# Patient Record
Sex: MALE | Race: WHITE | Employment: OTHER | URBAN - METROPOLITAN AREA
[De-identification: names, ages, dates, MRNs, and addresses within clinical notes are randomized per-mention and may not be internally consistent; named-entity substitution may affect disease eponyms.]

---

## 2019-05-09 ENCOUNTER — APPOINTMENT (OUTPATIENT)
Dept: GENERAL RADIOLOGY | Age: 84
DRG: 194 | End: 2019-05-09
Payer: MEDICARE

## 2019-05-09 ENCOUNTER — HOSPITAL ENCOUNTER (INPATIENT)
Age: 84
LOS: 4 days | Discharge: HOME | DRG: 194 | End: 2019-05-13
Attending: EMERGENCY MEDICINE | Admitting: INTERNAL MEDICINE
Payer: MEDICARE

## 2019-05-09 DIAGNOSIS — J18.9 PNEUMONIA DUE TO ORGANISM: Primary | ICD-10-CM

## 2019-05-09 DIAGNOSIS — G89.29 OTHER CHRONIC PAIN: ICD-10-CM

## 2019-05-09 LAB
A/G RATIO: 1.2 (ref 1.1–2.2)
ALBUMIN SERPL-MCNC: 3.1 G/DL (ref 3.4–5)
ALP BLD-CCNC: 33 U/L (ref 40–129)
ALT SERPL-CCNC: 12 U/L (ref 10–40)
ANION GAP SERPL CALCULATED.3IONS-SCNC: 11 MMOL/L (ref 3–16)
AST SERPL-CCNC: 17 U/L (ref 15–37)
BASOPHILS ABSOLUTE: 0 K/UL (ref 0–0.2)
BASOPHILS RELATIVE PERCENT: 0.5 %
BILIRUB SERPL-MCNC: 0.3 MG/DL (ref 0–1)
BUN BLDV-MCNC: 27 MG/DL (ref 7–20)
CALCIUM SERPL-MCNC: 8 MG/DL (ref 8.3–10.6)
CHLORIDE BLD-SCNC: 106 MMOL/L (ref 99–110)
CO2: 21 MMOL/L (ref 21–32)
CREAT SERPL-MCNC: 1.4 MG/DL (ref 0.8–1.3)
EOSINOPHILS ABSOLUTE: 0.8 K/UL (ref 0–0.6)
EOSINOPHILS RELATIVE PERCENT: 10.1 %
GFR AFRICAN AMERICAN: 58
GFR NON-AFRICAN AMERICAN: 48
GLOBULIN: 2.5 G/DL
GLUCOSE BLD-MCNC: 117 MG/DL (ref 70–99)
HCT VFR BLD CALC: 25.6 % (ref 40.5–52.5)
HEMOGLOBIN: 8.6 G/DL (ref 13.5–17.5)
INR BLD: 1.19 (ref 0.86–1.14)
LYMPHOCYTES ABSOLUTE: 1 K/UL (ref 1–5.1)
LYMPHOCYTES RELATIVE PERCENT: 13.3 %
MCH RBC QN AUTO: 32.1 PG (ref 26–34)
MCHC RBC AUTO-ENTMCNC: 33.4 G/DL (ref 31–36)
MCV RBC AUTO: 96.2 FL (ref 80–100)
MONOCYTES ABSOLUTE: 0.7 K/UL (ref 0–1.3)
MONOCYTES RELATIVE PERCENT: 9 %
NEUTROPHILS ABSOLUTE: 5.2 K/UL (ref 1.7–7.7)
NEUTROPHILS RELATIVE PERCENT: 67.1 %
PDW BLD-RTO: 13.7 % (ref 12.4–15.4)
PLATELET # BLD: 187 K/UL (ref 135–450)
PMV BLD AUTO: 5.9 FL (ref 5–10.5)
POTASSIUM REFLEX MAGNESIUM: 3.6 MMOL/L (ref 3.5–5.1)
PRO-BNP: 1695 PG/ML (ref 0–449)
PROTHROMBIN TIME: 13.6 SEC (ref 9.8–13)
RBC # BLD: 2.66 M/UL (ref 4.2–5.9)
SODIUM BLD-SCNC: 138 MMOL/L (ref 136–145)
TOTAL PROTEIN: 5.6 G/DL (ref 6.4–8.2)
TROPONIN: 0.03 NG/ML
WBC # BLD: 7.7 K/UL (ref 4–11)

## 2019-05-09 PROCEDURE — 99285 EMERGENCY DEPT VISIT HI MDM: CPT

## 2019-05-09 PROCEDURE — 84484 ASSAY OF TROPONIN QUANT: CPT

## 2019-05-09 PROCEDURE — 85025 COMPLETE CBC W/AUTO DIFF WBC: CPT

## 2019-05-09 PROCEDURE — 71045 X-RAY EXAM CHEST 1 VIEW: CPT

## 2019-05-09 PROCEDURE — 6360000002 HC RX W HCPCS: Performed by: EMERGENCY MEDICINE

## 2019-05-09 PROCEDURE — 2060000000 HC ICU INTERMEDIATE R&B

## 2019-05-09 PROCEDURE — 85610 PROTHROMBIN TIME: CPT

## 2019-05-09 PROCEDURE — 80053 COMPREHEN METABOLIC PANEL: CPT

## 2019-05-09 PROCEDURE — 96374 THER/PROPH/DIAG INJ IV PUSH: CPT

## 2019-05-09 PROCEDURE — 83880 ASSAY OF NATRIURETIC PEPTIDE: CPT

## 2019-05-09 PROCEDURE — 93005 ELECTROCARDIOGRAM TRACING: CPT | Performed by: EMERGENCY MEDICINE

## 2019-05-09 RX ORDER — MONTELUKAST SODIUM 10 MG/1
10 TABLET ORAL NIGHTLY
COMMUNITY

## 2019-05-09 RX ORDER — ONDANSETRON 4 MG/1
4 TABLET, FILM COATED ORAL EVERY 8 HOURS PRN
COMMUNITY

## 2019-05-09 RX ORDER — POLYETHYLENE GLYCOL 3350 17 G/17G
17 POWDER, FOR SOLUTION ORAL DAILY
COMMUNITY

## 2019-05-09 RX ORDER — MEMANTINE HYDROCHLORIDE 10 MG/1
10 TABLET ORAL 2 TIMES DAILY
COMMUNITY

## 2019-05-09 RX ORDER — PANTOPRAZOLE SODIUM 40 MG/1
40 GRANULE, DELAYED RELEASE ORAL
Status: ON HOLD | COMMUNITY
End: 2019-05-13 | Stop reason: HOSPADM

## 2019-05-09 RX ORDER — TOLTERODINE TARTRATE 2 MG/1
2 TABLET, EXTENDED RELEASE ORAL 2 TIMES DAILY
COMMUNITY

## 2019-05-09 RX ORDER — BUDESONIDE AND FORMOTEROL FUMARATE DIHYDRATE 80; 4.5 UG/1; UG/1
2 AEROSOL RESPIRATORY (INHALATION) 2 TIMES DAILY
COMMUNITY

## 2019-05-09 RX ORDER — DONEPEZIL HYDROCHLORIDE 5 MG/1
5 TABLET, FILM COATED ORAL NIGHTLY
COMMUNITY

## 2019-05-09 RX ORDER — ESCITALOPRAM OXALATE 10 MG/1
20 TABLET ORAL DAILY
COMMUNITY

## 2019-05-09 RX ORDER — AZITHROMYCIN 500 MG/1
500 INJECTION, POWDER, LYOPHILIZED, FOR SOLUTION INTRAVENOUS ONCE
Status: DISCONTINUED | OUTPATIENT
Start: 2019-05-09 | End: 2019-05-09 | Stop reason: CLARIF

## 2019-05-09 RX ORDER — ARFORMOTEROL TARTRATE 15 UG/2ML
1 SOLUTION RESPIRATORY (INHALATION) 2 TIMES DAILY
COMMUNITY

## 2019-05-09 RX ORDER — ATORVASTATIN CALCIUM 10 MG/1
10 TABLET, FILM COATED ORAL DAILY
COMMUNITY

## 2019-05-09 RX ORDER — BUDESONIDE 0.5 MG/2ML
1 INHALANT ORAL 2 TIMES DAILY
Status: ON HOLD | COMMUNITY
End: 2019-05-13 | Stop reason: HOSPADM

## 2019-05-09 RX ADMIN — Medication 1 G: at 23:06

## 2019-05-09 NOTE — LETTER
Beneficiary Notification Letter     This [facility] Provider is Participating in an Innovative Payment and [address] [place] from [address]:   [facility] is participating in a Medicare initiative called the [facility]. You are receiving this letter because your health care provider has identified you as a patient who is receiving care through this initiative. Health care providers participating in the [facility], including [facility], will work with Medicare to improve care for patients. Your Medicare rights have not been changed. You still have all the same Medicare rights and protections, including the right to choose which hospital, doctor, or other health care provider you see. However, because [facility] chose to participate in the [address] [place] [address], all Medicare beneficiaries who meet the eligibility criteria of this initiative will receive care under the initiative. If you do not wish to receive care under the Bundled Payments [facility], you must choose a health care provider that does not participate in this initiative for your care. Regardless of which health care provider you see, Medicare will continue to cover all of your medically necessary services. Bundled Payments for Care Improvement Advanced aims to help improve your care     The Bundled Payments [facility] is an innovative Medicare initiative that encourages your doctors, hospitals, and other health care providers to work more closely together so you get better care during and following certain hospital stays.  In this initiative, doctors and hospitals may work closely with certain health care providers and suppliers that help patients recover after discharge from the hospital, including skilled nursing facilities, home health agencies, inpatient rehabilitation facilities, and long term care hospitals. [facility] is working closely with the doctors and other health care providers that care for you during and following your hospital stay and for a period of time after you leave the hospital. By working together, the health care providers are trying to more efficiently provide well-managed, high quality, patient-centered care as you undergo treatment. Hospitals, doctors, and other health care providers that care for you following a hospital stay may receive an additional payment for providing better, more coordinated health care. Medicare will monitor your care to make sure you and others get high quality care. Your feedback is important     Medicare may also ask you to answer a survey about the services and care you received from [address] [place] [address] will be mailed to you. Your feedback will improve care for all people with Medicare who receive care from [facility]. Completion of this survey is optional.     Get more information     For more information about the Bundled Payments for [facility], you can:    · Visit the CMS BPCI Advanced Website at http://myers-mccoy.net/ initiatives/bpci-advanced   · Call the [facility] team at (651) [phone]. · Call 1-800-MEDICARE (6-750.588.2164). TTY users can call 5-202.575.5453     If you have concerns or complaints about your care, talk to your health care provider, or contact your Beneficiary and Family Centered Quality Improvement Organization [facility]). To get your [facility]'s phone number, visit Medicare.gov/contacts or call 1-800-MEDICARE. · To find a different hospital, visit www. hospitalcompare.[facility].gov or call 1-800[employer] MEDICARE (1-263.798.7886). TTY users should call 9-586.330.9780. · To find a different doctor, visit Medicare's Physician Compare website, HDTapes.co.nz, or call 1-800-MEDICARE (065 6058). TTY users should call 9-343.669.2085. · To find a different skilled nursing facility, visit [facility] Medico website, https://www.Kitchon.Qbox.io/, or call 1-800-MEDICARE (1- 581.566.6681). TTY users should call 9-455.757.7270. · To find a different long term care hospital, visit [facility] Compare website, Smellology.be, or call 1-800- MEDICARE (092 4980). TTY users should call 2-755.992.6025. · To find a different inpatient rehabilitation facility, visit [address] [facility] [address] Compare website, www.medicare.gov/ inpatientrehabilitation facilitycompare, or call 1-800-MEDICARE (4-185.574.8746). TTY users should call 8- 561.488.6110. · To find a different home health agency, visit [address] website, www.medicare.gov/homehealthcompare, or call 1-800-MEDICARE (2-382- 359-5016). TTY users should call 7-893.975.1158.

## 2019-05-10 PROBLEM — I50.32 CHRONIC DIASTOLIC HEART FAILURE (HCC): Status: ACTIVE | Noted: 2019-05-10

## 2019-05-10 LAB
A/G RATIO: 1.2 (ref 1.1–2.2)
ALBUMIN SERPL-MCNC: 3.5 G/DL (ref 3.4–5)
ALP BLD-CCNC: 36 U/L (ref 40–129)
ALT SERPL-CCNC: 14 U/L (ref 10–40)
ANION GAP SERPL CALCULATED.3IONS-SCNC: 11 MMOL/L (ref 3–16)
AST SERPL-CCNC: 20 U/L (ref 15–37)
BASOPHILS ABSOLUTE: 0.1 K/UL (ref 0–0.2)
BASOPHILS RELATIVE PERCENT: 0.7 %
BILIRUB SERPL-MCNC: 0.3 MG/DL (ref 0–1)
BUN BLDV-MCNC: 29 MG/DL (ref 7–20)
CALCIUM SERPL-MCNC: 9.8 MG/DL (ref 8.3–10.6)
CHLORIDE BLD-SCNC: 100 MMOL/L (ref 99–110)
CO2: 28 MMOL/L (ref 21–32)
CREAT SERPL-MCNC: 1.8 MG/DL (ref 0.8–1.3)
EKG ATRIAL RATE: 70 BPM
EKG DIAGNOSIS: NORMAL
EKG Q-T INTERVAL: 444 MS
EKG QRS DURATION: 116 MS
EKG QTC CALCULATION (BAZETT): 482 MS
EKG R AXIS: 19 DEGREES
EKG T AXIS: 30 DEGREES
EKG VENTRICULAR RATE: 71 BPM
EOSINOPHILS ABSOLUTE: 0.8 K/UL (ref 0–0.6)
EOSINOPHILS RELATIVE PERCENT: 10.2 %
GFR AFRICAN AMERICAN: 43
GFR NON-AFRICAN AMERICAN: 36
GLOBULIN: 2.9 G/DL
GLUCOSE BLD-MCNC: 94 MG/DL (ref 70–99)
HCT VFR BLD CALC: 30.5 % (ref 40.5–52.5)
HEMOGLOBIN: 10.3 G/DL (ref 13.5–17.5)
LEFT VENTRICULAR EJECTION FRACTION HIGH VALUE: 60 %
LEFT VENTRICULAR EJECTION FRACTION MODE: NORMAL
LV EF: 60 %
LVEF MODALITY: NORMAL
LYMPHOCYTES ABSOLUTE: 1.2 K/UL (ref 1–5.1)
LYMPHOCYTES RELATIVE PERCENT: 16.1 %
MCH RBC QN AUTO: 31.7 PG (ref 26–34)
MCHC RBC AUTO-ENTMCNC: 33.7 G/DL (ref 31–36)
MCV RBC AUTO: 94.1 FL (ref 80–100)
MONOCYTES ABSOLUTE: 0.7 K/UL (ref 0–1.3)
MONOCYTES RELATIVE PERCENT: 8.8 %
NEUTROPHILS ABSOLUTE: 4.9 K/UL (ref 1.7–7.7)
NEUTROPHILS RELATIVE PERCENT: 64.2 %
PDW BLD-RTO: 13.8 % (ref 12.4–15.4)
PLATELET # BLD: 201 K/UL (ref 135–450)
PMV BLD AUTO: 6 FL (ref 5–10.5)
POTASSIUM REFLEX MAGNESIUM: 4 MMOL/L (ref 3.5–5.1)
RBC # BLD: 3.24 M/UL (ref 4.2–5.9)
SODIUM BLD-SCNC: 139 MMOL/L (ref 136–145)
TOTAL PROTEIN: 6.4 G/DL (ref 6.4–8.2)
TROPONIN: 0.03 NG/ML
WBC # BLD: 7.7 K/UL (ref 4–11)

## 2019-05-10 PROCEDURE — 6360000002 HC RX W HCPCS: Performed by: INTERNAL MEDICINE

## 2019-05-10 PROCEDURE — 94760 N-INVAS EAR/PLS OXIMETRY 1: CPT

## 2019-05-10 PROCEDURE — 6370000000 HC RX 637 (ALT 250 FOR IP): Performed by: INTERNAL MEDICINE

## 2019-05-10 PROCEDURE — 6360000004 HC RX CONTRAST MEDICATION: Performed by: INTERNAL MEDICINE

## 2019-05-10 PROCEDURE — 36415 COLL VENOUS BLD VENIPUNCTURE: CPT

## 2019-05-10 PROCEDURE — 97535 SELF CARE MNGMENT TRAINING: CPT

## 2019-05-10 PROCEDURE — 2580000003 HC RX 258: Performed by: EMERGENCY MEDICINE

## 2019-05-10 PROCEDURE — 2580000003 HC RX 258: Performed by: INTERNAL MEDICINE

## 2019-05-10 PROCEDURE — 6360000002 HC RX W HCPCS: Performed by: EMERGENCY MEDICINE

## 2019-05-10 PROCEDURE — C8929 TTE W OR WO FOL WCON,DOPPLER: HCPCS

## 2019-05-10 PROCEDURE — 84484 ASSAY OF TROPONIN QUANT: CPT

## 2019-05-10 PROCEDURE — 2060000000 HC ICU INTERMEDIATE R&B

## 2019-05-10 PROCEDURE — 97166 OT EVAL MOD COMPLEX 45 MIN: CPT

## 2019-05-10 PROCEDURE — 80053 COMPREHEN METABOLIC PANEL: CPT

## 2019-05-10 PROCEDURE — 2580000003 HC RX 258

## 2019-05-10 PROCEDURE — 93010 ELECTROCARDIOGRAM REPORT: CPT | Performed by: INTERNAL MEDICINE

## 2019-05-10 PROCEDURE — 85025 COMPLETE CBC W/AUTO DIFF WBC: CPT

## 2019-05-10 PROCEDURE — 94640 AIRWAY INHALATION TREATMENT: CPT

## 2019-05-10 RX ORDER — GABAPENTIN 100 MG/1
100 CAPSULE ORAL NIGHTLY
Status: DISCONTINUED | OUTPATIENT
Start: 2019-05-10 | End: 2019-05-13 | Stop reason: HOSPADM

## 2019-05-10 RX ORDER — ISOSORBIDE MONONITRATE 60 MG/1
60 TABLET, EXTENDED RELEASE ORAL DAILY
Status: DISCONTINUED | OUTPATIENT
Start: 2019-05-10 | End: 2019-05-13 | Stop reason: HOSPADM

## 2019-05-10 RX ORDER — FORMOTEROL FUMARATE 20 UG/2ML
20 SOLUTION RESPIRATORY (INHALATION) 2 TIMES DAILY
Status: DISCONTINUED | OUTPATIENT
Start: 2019-05-10 | End: 2019-05-13 | Stop reason: HOSPADM

## 2019-05-10 RX ORDER — DOCUSATE SODIUM 100 MG/1
100 CAPSULE, LIQUID FILLED ORAL 2 TIMES DAILY
Status: DISCONTINUED | OUTPATIENT
Start: 2019-05-10 | End: 2019-05-13 | Stop reason: HOSPADM

## 2019-05-10 RX ORDER — BUDESONIDE 0.5 MG/2ML
500 INHALANT ORAL 2 TIMES DAILY
Status: DISCONTINUED | OUTPATIENT
Start: 2019-05-10 | End: 2019-05-13 | Stop reason: HOSPADM

## 2019-05-10 RX ORDER — ESCITALOPRAM OXALATE 10 MG/1
10 TABLET ORAL DAILY
Status: DISCONTINUED | OUTPATIENT
Start: 2019-05-10 | End: 2019-05-13 | Stop reason: HOSPADM

## 2019-05-10 RX ORDER — M-VIT,TX,IRON,MINS/CALC/FOLIC 27MG-0.4MG
1 TABLET ORAL DAILY
Status: DISCONTINUED | OUTPATIENT
Start: 2019-05-10 | End: 2019-05-13 | Stop reason: HOSPADM

## 2019-05-10 RX ORDER — SODIUM CHLORIDE 9 MG/ML
INJECTION, SOLUTION INTRAVENOUS
Status: COMPLETED
Start: 2019-05-10 | End: 2019-05-10

## 2019-05-10 RX ORDER — LATANOPROST 50 UG/ML
1 SOLUTION/ DROPS OPHTHALMIC NIGHTLY
Status: DISCONTINUED | OUTPATIENT
Start: 2019-05-10 | End: 2019-05-13 | Stop reason: HOSPADM

## 2019-05-10 RX ORDER — NITROGLYCERIN 0.4 MG/1
0.4 TABLET SUBLINGUAL EVERY 5 MIN PRN
Status: DISCONTINUED | OUTPATIENT
Start: 2019-05-10 | End: 2019-05-13 | Stop reason: HOSPADM

## 2019-05-10 RX ORDER — OYSTER SHELL CALCIUM WITH VITAMIN D 500; 200 MG/1; [IU]/1
1 TABLET, FILM COATED ORAL DAILY
Status: DISCONTINUED | OUTPATIENT
Start: 2019-05-10 | End: 2019-05-13 | Stop reason: HOSPADM

## 2019-05-10 RX ORDER — LOPERAMIDE HYDROCHLORIDE 2 MG/1
2 CAPSULE ORAL EVERY 6 HOURS PRN
Status: DISCONTINUED | OUTPATIENT
Start: 2019-05-10 | End: 2019-05-13 | Stop reason: HOSPADM

## 2019-05-10 RX ORDER — FUROSEMIDE 20 MG/1
20 TABLET ORAL DAILY
Status: DISCONTINUED | OUTPATIENT
Start: 2019-05-10 | End: 2019-05-13 | Stop reason: HOSPADM

## 2019-05-10 RX ORDER — TROSPIUM CHLORIDE 20 MG/1
20 TABLET, FILM COATED ORAL
Status: DISCONTINUED | OUTPATIENT
Start: 2019-05-10 | End: 2019-05-13 | Stop reason: HOSPADM

## 2019-05-10 RX ORDER — PROMETHAZINE HYDROCHLORIDE 25 MG/1
25 TABLET ORAL EVERY 6 HOURS PRN
Status: DISCONTINUED | OUTPATIENT
Start: 2019-05-10 | End: 2019-05-13 | Stop reason: HOSPADM

## 2019-05-10 RX ORDER — MEMANTINE HYDROCHLORIDE 5 MG/1
10 TABLET ORAL 2 TIMES DAILY
Status: DISCONTINUED | OUTPATIENT
Start: 2019-05-10 | End: 2019-05-13 | Stop reason: HOSPADM

## 2019-05-10 RX ORDER — ONDANSETRON 4 MG/1
4 TABLET, ORALLY DISINTEGRATING ORAL EVERY 8 HOURS PRN
Status: DISCONTINUED | OUTPATIENT
Start: 2019-05-10 | End: 2019-05-13 | Stop reason: HOSPADM

## 2019-05-10 RX ORDER — ASPIRIN 81 MG/1
81 TABLET ORAL DAILY
Status: DISCONTINUED | OUTPATIENT
Start: 2019-05-10 | End: 2019-05-13 | Stop reason: HOSPADM

## 2019-05-10 RX ORDER — ATORVASTATIN CALCIUM 10 MG/1
10 TABLET, FILM COATED ORAL DAILY
Status: DISCONTINUED | OUTPATIENT
Start: 2019-05-10 | End: 2019-05-13 | Stop reason: HOSPADM

## 2019-05-10 RX ORDER — ALBUTEROL SULFATE 90 UG/1
2 AEROSOL, METERED RESPIRATORY (INHALATION) EVERY 6 HOURS PRN
Status: DISCONTINUED | OUTPATIENT
Start: 2019-05-10 | End: 2019-05-13 | Stop reason: HOSPADM

## 2019-05-10 RX ORDER — LATANOPROST 50 UG/ML
1 SOLUTION/ DROPS OPHTHALMIC NIGHTLY
Status: DISCONTINUED | OUTPATIENT
Start: 2019-05-10 | End: 2019-05-10 | Stop reason: SDUPTHER

## 2019-05-10 RX ORDER — DONEPEZIL HYDROCHLORIDE 5 MG/1
5 TABLET, FILM COATED ORAL NIGHTLY
Status: DISCONTINUED | OUTPATIENT
Start: 2019-05-10 | End: 2019-05-13 | Stop reason: HOSPADM

## 2019-05-10 RX ORDER — TAMSULOSIN HYDROCHLORIDE 0.4 MG/1
0.4 CAPSULE ORAL DAILY
Status: DISCONTINUED | OUTPATIENT
Start: 2019-05-10 | End: 2019-05-13 | Stop reason: HOSPADM

## 2019-05-10 RX ORDER — MONTELUKAST SODIUM 10 MG/1
10 TABLET ORAL NIGHTLY
Status: DISCONTINUED | OUTPATIENT
Start: 2019-05-10 | End: 2019-05-13 | Stop reason: HOSPADM

## 2019-05-10 RX ORDER — OMEGA-3/DHA/EPA/FISH OIL 300-1000MG
2 CAPSULE ORAL DAILY
Status: DISCONTINUED | OUTPATIENT
Start: 2019-05-10 | End: 2019-05-10 | Stop reason: RX

## 2019-05-10 RX ORDER — BETAMETHASONE DIPROPIONATE 0.5 MG/G
CREAM TOPICAL 2 TIMES DAILY
Status: DISCONTINUED | OUTPATIENT
Start: 2019-05-10 | End: 2019-05-13 | Stop reason: HOSPADM

## 2019-05-10 RX ORDER — ACETAMINOPHEN 325 MG/1
650 TABLET ORAL ONCE
Status: DISCONTINUED | OUTPATIENT
Start: 2019-05-10 | End: 2019-05-13 | Stop reason: HOSPADM

## 2019-05-10 RX ORDER — IPRATROPIUM BROMIDE AND ALBUTEROL SULFATE 2.5; .5 MG/3ML; MG/3ML
1 SOLUTION RESPIRATORY (INHALATION) 2 TIMES DAILY
Status: DISCONTINUED | OUTPATIENT
Start: 2019-05-10 | End: 2019-05-10

## 2019-05-10 RX ORDER — SODIUM CHLORIDE 9 MG/ML
INJECTION, SOLUTION INTRAVENOUS CONTINUOUS
Status: DISCONTINUED | OUTPATIENT
Start: 2019-05-10 | End: 2019-05-10

## 2019-05-10 RX ORDER — IPRATROPIUM BROMIDE AND ALBUTEROL SULFATE 2.5; .5 MG/3ML; MG/3ML
1 SOLUTION RESPIRATORY (INHALATION) EVERY 4 HOURS PRN
Status: DISCONTINUED | OUTPATIENT
Start: 2019-05-10 | End: 2019-05-13 | Stop reason: HOSPADM

## 2019-05-10 RX ORDER — IPRATROPIUM BROMIDE AND ALBUTEROL SULFATE 2.5; .5 MG/3ML; MG/3ML
1 SOLUTION RESPIRATORY (INHALATION)
Status: DISCONTINUED | OUTPATIENT
Start: 2019-05-10 | End: 2019-05-12

## 2019-05-10 RX ORDER — ACETAMINOPHEN 325 MG/1
650 TABLET ORAL EVERY 6 HOURS PRN
Status: DISCONTINUED | OUTPATIENT
Start: 2019-05-10 | End: 2019-05-13 | Stop reason: HOSPADM

## 2019-05-10 RX ORDER — HYDRALAZINE HYDROCHLORIDE 25 MG/1
50 TABLET, FILM COATED ORAL 3 TIMES DAILY
Status: DISCONTINUED | OUTPATIENT
Start: 2019-05-10 | End: 2019-05-13 | Stop reason: HOSPADM

## 2019-05-10 RX ORDER — PANTOPRAZOLE SODIUM 40 MG/1
40 GRANULE, DELAYED RELEASE ORAL
Status: DISCONTINUED | OUTPATIENT
Start: 2019-05-10 | End: 2019-05-10 | Stop reason: CLARIF

## 2019-05-10 RX ORDER — POLYETHYLENE GLYCOL 3350 17 G/17G
17 POWDER, FOR SOLUTION ORAL DAILY PRN
Status: DISCONTINUED | OUTPATIENT
Start: 2019-05-10 | End: 2019-05-13 | Stop reason: HOSPADM

## 2019-05-10 RX ORDER — PANTOPRAZOLE SODIUM 40 MG/1
40 TABLET, DELAYED RELEASE ORAL
Status: DISCONTINUED | OUTPATIENT
Start: 2019-05-10 | End: 2019-05-13 | Stop reason: HOSPADM

## 2019-05-10 RX ORDER — HYDROCODONE BITARTRATE AND ACETAMINOPHEN 5; 325 MG/1; MG/1
1 TABLET ORAL EVERY 6 HOURS PRN
Status: DISCONTINUED | OUTPATIENT
Start: 2019-05-10 | End: 2019-05-13 | Stop reason: HOSPADM

## 2019-05-10 RX ORDER — FERROUS GLUCONATE 324(37.5)
324 TABLET ORAL 2 TIMES DAILY
Status: DISCONTINUED | OUTPATIENT
Start: 2019-05-10 | End: 2019-05-13 | Stop reason: HOSPADM

## 2019-05-10 RX ADMIN — ISOSORBIDE MONONITRATE 60 MG: 60 TABLET, EXTENDED RELEASE ORAL at 10:38

## 2019-05-10 RX ADMIN — FERROUS GLUCONATE TAB 324 MG (37.5 MG ELEMENTAL IRON) 324 MG: 324 (37.5 FE) TAB at 21:09

## 2019-05-10 RX ADMIN — IPRATROPIUM BROMIDE AND ALBUTEROL SULFATE 1 AMPULE: .5; 3 SOLUTION RESPIRATORY (INHALATION) at 09:50

## 2019-05-10 RX ADMIN — DOCUSATE SODIUM 100 MG: 100 CAPSULE, LIQUID FILLED ORAL at 21:08

## 2019-05-10 RX ADMIN — IPRATROPIUM BROMIDE AND ALBUTEROL SULFATE 1 AMPULE: .5; 3 SOLUTION RESPIRATORY (INHALATION) at 16:46

## 2019-05-10 RX ADMIN — METOPROLOL TARTRATE 25 MG: 25 TABLET, FILM COATED ORAL at 21:09

## 2019-05-10 RX ADMIN — ENOXAPARIN SODIUM 40 MG: 40 INJECTION SUBCUTANEOUS at 01:43

## 2019-05-10 RX ADMIN — Medication 2 PUFF: at 09:52

## 2019-05-10 RX ADMIN — METOPROLOL TARTRATE 25 MG: 25 TABLET, FILM COATED ORAL at 10:37

## 2019-05-10 RX ADMIN — BUDESONIDE 500 MCG: 0.5 SUSPENSION RESPIRATORY (INHALATION) at 09:50

## 2019-05-10 RX ADMIN — HYDRALAZINE HYDROCHLORIDE 50 MG: 25 TABLET, FILM COATED ORAL at 10:37

## 2019-05-10 RX ADMIN — MULTIPLE VITAMINS W/ MINERALS TAB 1 TABLET: TAB at 10:38

## 2019-05-10 RX ADMIN — SODIUM CHLORIDE: 9 INJECTION, SOLUTION INTRAVENOUS at 15:16

## 2019-05-10 RX ADMIN — TAMSULOSIN HYDROCHLORIDE 0.4 MG: 0.4 CAPSULE ORAL at 10:37

## 2019-05-10 RX ADMIN — MEMANTINE HYDROCHLORIDE 10 MG: 5 TABLET ORAL at 21:09

## 2019-05-10 RX ADMIN — SODIUM CHLORIDE 250 ML: 9 INJECTION, SOLUTION INTRAVENOUS at 01:45

## 2019-05-10 RX ADMIN — AZITHROMYCIN MONOHYDRATE 500 MG: 500 INJECTION, POWDER, LYOPHILIZED, FOR SOLUTION INTRAVENOUS at 01:42

## 2019-05-10 RX ADMIN — TAZOBACTAM SODIUM AND PIPERACILLIN SODIUM 3.38 G: 375; 3 INJECTION, SOLUTION INTRAVENOUS at 21:08

## 2019-05-10 RX ADMIN — TAZOBACTAM SODIUM AND PIPERACILLIN SODIUM 3.38 G: 375; 3 INJECTION, SOLUTION INTRAVENOUS at 04:53

## 2019-05-10 RX ADMIN — TIOTROPIUM BROMIDE INHALATION SPRAY 2 PUFF: 3.12 SPRAY, METERED RESPIRATORY (INHALATION) at 09:50

## 2019-05-10 RX ADMIN — GABAPENTIN 100 MG: 100 CAPSULE ORAL at 21:09

## 2019-05-10 RX ADMIN — CALCIUM CARBONATE-VITAMIN D TAB 500 MG-200 UNIT 1 TABLET: 500-200 TAB at 10:36

## 2019-05-10 RX ADMIN — DONEPEZIL HYDROCHLORIDE 5 MG: 5 TABLET, FILM COATED ORAL at 21:09

## 2019-05-10 RX ADMIN — TROSPIUM CHLORIDE 20 MG: 20 TABLET ORAL at 05:17

## 2019-05-10 RX ADMIN — BUDESONIDE 500 MCG: 0.5 SUSPENSION RESPIRATORY (INHALATION) at 21:03

## 2019-05-10 RX ADMIN — HYDRALAZINE HYDROCHLORIDE 50 MG: 25 TABLET, FILM COATED ORAL at 15:16

## 2019-05-10 RX ADMIN — FUROSEMIDE 20 MG: 20 TABLET ORAL at 10:37

## 2019-05-10 RX ADMIN — ATORVASTATIN CALCIUM 10 MG: 10 TABLET, FILM COATED ORAL at 10:36

## 2019-05-10 RX ADMIN — MONTELUKAST SODIUM 10 MG: 10 TABLET, FILM COATED ORAL at 21:09

## 2019-05-10 RX ADMIN — IPRATROPIUM BROMIDE AND ALBUTEROL SULFATE 1 AMPULE: .5; 3 SOLUTION RESPIRATORY (INHALATION) at 21:02

## 2019-05-10 RX ADMIN — ESCITALOPRAM OXALATE 10 MG: 10 TABLET ORAL at 10:38

## 2019-05-10 RX ADMIN — TAZOBACTAM SODIUM AND PIPERACILLIN SODIUM 3.38 G: 375; 3 INJECTION, SOLUTION INTRAVENOUS at 15:08

## 2019-05-10 RX ADMIN — PERFLUTREN 1.65 MG: 6.52 INJECTION, SUSPENSION INTRAVENOUS at 14:10

## 2019-05-10 RX ADMIN — FERROUS GLUCONATE TAB 324 MG (37.5 MG ELEMENTAL IRON) 324 MG: 324 (37.5 FE) TAB at 10:38

## 2019-05-10 RX ADMIN — FORMOTEROL FUMARATE DIHYDRATE 20 MCG: 20 SOLUTION RESPIRATORY (INHALATION) at 21:03

## 2019-05-10 RX ADMIN — TROSPIUM CHLORIDE 20 MG: 20 TABLET ORAL at 17:18

## 2019-05-10 RX ADMIN — ASPIRIN 81 MG: 81 TABLET, COATED ORAL at 10:36

## 2019-05-10 RX ADMIN — DOCUSATE SODIUM 100 MG: 100 CAPSULE, LIQUID FILLED ORAL at 10:36

## 2019-05-10 RX ADMIN — PANTOPRAZOLE SODIUM 40 MG: 40 TABLET, DELAYED RELEASE ORAL at 05:17

## 2019-05-10 RX ADMIN — FORMOTEROL FUMARATE DIHYDRATE 20 MCG: 20 SOLUTION RESPIRATORY (INHALATION) at 09:51

## 2019-05-10 RX ADMIN — MEMANTINE HYDROCHLORIDE 10 MG: 5 TABLET ORAL at 10:37

## 2019-05-10 ASSESSMENT — PAIN SCALES - GENERAL
PAINLEVEL_OUTOF10: 0

## 2019-05-10 NOTE — PROGRESS NOTES
Medication list complete, changes made, sticky note to be left on chart for Dr. Ruddy Diaz.   Juanjo Santoyo

## 2019-05-10 NOTE — ED NOTES
Pt a&o x4. Pt c/o increased SOB. Pt has hx of COPD and . IV established by Squad without complications, blood work obtained by this RN and sent to lab. Pt tolerated well. Pt placed on appropriate monitors and cycling. NSR with a HR of 70s. ST segment alarm enabled. Pt sitting supine in bed in low position, call light in reach, side rails up x2. Pt showing no signs of distress at this time. Breathing easy and unlabored. Will continue to monitor.         Tariq Figueredo RN  05/09/19 5878

## 2019-05-10 NOTE — PROGRESS NOTES
Occupational Therapy   Occupational Therapy Initial Assessment  Date: 5/10/2019   Patient Name: Amna Sage  MRN: [id number]     : 1929    Date of Service: 5/10/2019    Discharge Recommendations:Arnoldo Schmidt scored a 16/24 on the AM-PAC ADL Inpatient form. Current research shows that an AM-PAC score of 17 or less is typically not associated with a discharge to the patient's home setting. Based on the patients AM-PAC score and their current ADL deficits, it is recommended that the patient have 3-5 sessions per week of Occupational Therapy at d/c to increase the patients independence. If patient declines SNF, recommend HHOT S3 level. OT Equipment Recommendations  Equipment Needed: No    Assessment   Performance deficits / Impairments: Decreased functional mobility ; Decreased endurance;Decreased balance;Decreased ADL status  Assessment: Patient presents with the above deficits impacting occupational performance and functioning below baseline level. Treatment Diagnosis: Decreased functional mobility, ADLS, endurance, balance associated with PNA  Prognosis: Good  Decision Making: Medium Complexity  Patient Education: OT role, discharge  Barriers to Learning: vision  REQUIRES OT FOLLOW UP: Yes  Activity Tolerance  Activity Tolerance: Patient Tolerated treatment well;Patient limited by fatigue  Safety Devices  Safety Devices in place: Yes  Type of devices: All fall risk precautions in place;Gait belt;Bed alarm in place;Nurse notified; Patient at risk for falls;Call light within reach           Patient Diagnosis(es): The encounter diagnosis was Pneumonia due to organism.      has a past medical history of Acute MI ([address] Anemia, Arthritis, Atrial fibrillation ([address] CAD (coronary artery disease), Cancer ([address] CHF (congestive heart failure) ([address] Chronic insomnia, Chronic kidney disease, COPD (chronic obstructive pulmonary disease) ([address] Depression, GERD (gastroesophageal reflux disease), min  Activity: hand washing at sink, ambulation in room  Functional Mobility  Functional - Mobility Device: Rolling Walker  Activity: To/from bathroom; Other  Assist Level: Minimal assistance  Functional Mobility Comments: Decreased R foot stability due to prior CVA  Toilet Transfers  Toilet - Technique: Ambulating  Equipment Used: Standard toilet  Toilet Transfer: Minimal assistance  Toilet Transfers Comments: cues for hand placement getting up and down  ADL  Grooming: Contact guard assistance(stance at sink, cues for item location due to decreased vision)  LE Dressing: Maximum assistance(socks)  Toileting: Maximum assistance(seated on toilet)  Tone RUE  RUE Tone: Normotonic  Tone LUE  LUE Tone: Normotonic  Coordination  Movements Are Fluid And Coordinated: No  Coordination and Movement description: Tremors        Transfers  Stand Step Transfers: Minimal assistance  Sit to stand: Contact guard assistance  Stand to sit: Contact guard assistance     Cognition  Overall Cognitive Status: WFL        Sensation  Overall Sensation Status: Impaired  Additional Comments: decreased sensation B feet        LUE AROM (degrees)  LUE AROM : WFL  RUE AROM (degrees)  RUE AROM : WFL                      Plan   Plan  Times per week: 3-5x  Times per day: Daily  Current Treatment Recommendations: Balance Training, Endurance Training, Functional Mobility Training, Safety Education & Training, Self-Care / ADL    G-Code     OutComes Score                                                  AM-PAC Score        AM-[facility] Inpatient Daily Activity Raw Score: 16  AM-PAC Inpatient ADL T-Scale Score : 35.96  ADL Inpatient CMS 0-100% Score: 53.32  ADL Inpatient CMS G-Code Modifier : CK    Goals  Short term goals  Time Frame for Short term goals: Discharge  Short term goal 1: SBA with functional ADL mobility  Short term goal 2: Supervision for functional ADL transfers  Short term goal 3: set up for UB ADLs  Short term goal 4: SBA with toileting  Short term goal 5: Grooming at sink supervision  Patient Goals   Patient goals : return to his apartment       Therapy Time   Individual Concurrent Group Co-treatment   Time In 1130         Time Out 1203         Minutes 33              Timed Code Treatment Minutes:   18    Total Treatment Minutes:  [facility], [facility]  Irvin Schmitz 103

## 2019-05-10 NOTE — CARE COORDINATION
Met with patient to offer assistance with discharge planning, and he is insisting on returning to the [facility] (not [facility]).    Please order home care,  thank you

## 2019-05-10 NOTE — ED NOTES
Spoke with NH RN and updated, v/u and no concerns or questions left unaddressed.        Tariq Figueredo RN  05/09/19 4305

## 2019-05-10 NOTE — PROGRESS NOTES
No change from previous assessment. Vitals stable. Morning medication given. Denies any other needs at this time, callbell in reach, bed alarm on and video monitor in use.   myrna Soliz RN

## 2019-05-10 NOTE — PLAN OF CARE
Up to bathroom with use of walker and back to a chair- set up with breakfast tray- large soft black BM uriah care prior to chair- am meds given whole in applesauce- per significant other phone call patient sees pulmonogist Dr Karlene Bone at [phone] alarm on - call light in reach -camera in place to ensure safety

## 2019-05-10 NOTE — ED PROVIDER NOTES
[address] [facility]  eMERGENCY dEPARTMENTeNCOUnter      Pt Name: Hawa Nguyen  MRN: [id number]  [place] 7/21/1929  Date ofevaluation: 5/9/2019  Provider: Kvng Curry MD    CHIEF COMPLAINT       Chief Complaint   Patient presents with    Shortness of Breath     pt brought in by [facility] the [facility] [address]. Pt c/o SOB x2 hrs. Pt hx of CHF, COPD, & Pneumonia. Recieved 1 duoneb in route by EMS. Pt sts he has had a cough that he noticed today. HISTORY OF PRESENT ILLNESS   (Location/Symptom, Timing/Onset,Context/Setting, Quality, Duration, Modifying Factors, Severity)  Note limiting factors. Hawa Nguyen is a 80 y.o. male who presents to the emergency department shortness of breath. The patient presents with shortness of breath that started around 2-3 hours prior to arrival.  He complains of a mild cough. He denies any chest pain. He denies any fevers or chills. HPI    NursingNotes were reviewed. REVIEW OF SYSTEMS    (2-9 systems for level 4, 10 or more for level 5)     Review of Systems  Negative for GI  musculoskeletal neurological pulmonary and cardiac complaints and all others reviewed and negative  General Appearance:  Alert, cooperative, no distress, appears stated age. Head:  Normocephalic, without obvious abnormality, atraumatic. Eyes:  conjunctiva/corneas clear, EOM's intact. Sclera anicteric. ENT: Mucous membranes moist.   Neck: Supple, symmetrical, trachea midline, no adenopathy. No jugular venous distention. Lungs:   No Respiratory Distress. Chest Wall:     Heart:  Regular rate rhythm with no rubs or gallops. Grade 1 systolic ejection murmur. Abdomen:   Soft and benign with no pulsatile masses    Extremities:  Full range of motion. no significant edema. Pulses: Good throughout    Skin:  No rashes or lesions to exposed skin. Neurologic: Alert and oriented X 3. Motor grossly normal.  Speech clear.         Except as TABLET    Take 5 mg by mouth nightly    FERROUS GLUCONATE 325 (36 FE) MG TABS    Take by mouth    FEXOFENADINE HCL (ALLEGRA PO)    Take by mouth    FISH OIL-OMEGA-3 FATTY ACIDS 1000 MG CAPSULE    Take 2 g by mouth daily. FUROSEMIDE (LASIX) 20 MG TABLET    Take 1 tablet by mouth daily. GABAPENTIN (NEURONTIN) 100 MG CAPSULE    Take 100 mg by mouth nightly     HYDRALAZINE (APRESOLINE) 50 MG TABLET    Take 50 mg by mouth 3 times daily. HYDROCODONE-ACETAMINOPHEN (NORCO) 5-325 MG PER TABLET    Take 1 tablet by mouth every 6 hours as needed. IPRATROPIUM-ALBUTEROL (COMBIVENT IN)    Inhale  into the lungs. IPRATROPIUM-ALBUTEROL (DUONEB) 0.5-2.5 (3) MG/3ML SOLN NEBULIZER SOLUTION    Inhale 1 vial into the lungs 2 times daily. ISOSORBIDE MONONITRATE (IMDUR) 60 MG CR TABLET    Take 60 mg by mouth daily. LOPERAMIDE (IMODIUM) 2 MG CAPSULE    Take 2 mg by mouth every 6 hours as needed. MAGNESIUM HYDROXIDE (MILK OF MAGNESIA) 400 MG/5ML SUSPENSION    Take  by mouth daily as needed for Constipation. METOPROLOL (LOPRESSOR) 50 MG TABLET    Take 50 mg by mouth 2 times daily. MIRTAZAPINE (REMERON) 15 MG TABLET    Take 15 mg by mouth nightly. MULTIPLE VITAMINS-MINERALS (OCUVITE EYE + MULTI PO)    Take  by mouth. NITROGLYCERIN (NITROSTAT) 0.4 MG SL TABLET    Place 1 tablet under the tongue every 5 minutes as needed for Chest pain. OMEPRAZOLE (PRILOSEC) 20 MG CAPSULE    Take 20 mg by mouth 2 times daily. POTASSIUM CHLORIDE (K-DUR) 10 MEQ TABLET    Take 1 tablet by mouth 2 times daily. PROMETHAZINE (PHENERGAN) 25 MG TABLET    Take 25 mg by mouth every 6 hours as needed. SIMVASTATIN (ZOCOR) 20 MG TABLET    Take 20 mg by mouth nightly. SOAP & CLEANSERS (MOISTUREL SKIN CLEANSER EX)    Apply  topically. TAMSULOSIN (FLOMAX) 0.4 MG CAPSULE    Take 0.4 mg by mouth daily.          ALLERGIES     Codeine    FAMILY HISTORY       Family History   Problem Relation Age of Onset    High Blood Pressure Mother     High Blood Pressure Father     Cancer Brother     Heart Disease Neg Hx     High Cholesterol Neg Hx           SOCIAL HISTORY       Social History     Socioeconomic History    Marital status:       Spouse name: None    Number of children: None    Years of education: None    Highest education level: None   Occupational History    None   Social Needs    Financial resource strain: None    Food insecurity:     Worry: None     Inability: None    Transportation needs:     Medical: None     Non-medical: None   Tobacco Use    Smoking status: Former Smoker     Last attempt to quit: 4/3/1952     Years since quittin.1    Smokeless tobacco: Never Used   Substance and Sexual Activity    Alcohol use: Yes     Comment: social    Drug use: No    Sexual activity: Not Currently   Lifestyle    Physical activity:     Days per week: None     Minutes per session: None    Stress: None   Relationships    Social connections:     Talks on phone: None     Gets together: None     Attends [employer] service: None     Active member of club or organization: None     Attends meetings of clubs or organizations: None     Relationship status: None    Intimate partner violence:     Fear of current or ex partner: None     Emotionally abused: None     Physically abused: None     Forced sexual activity: None   Other Topics Concern    None   Social History Narrative    None       SCREENINGS             PHYSICAL EXAM    (up to 7 for level 4, 8 or more for level 5)     ED Triage Vitals [19 2104]   BP Temp Temp Source Pulse Resp SpO2 Height Weight   100/81 97.9 °F (36.6 °C) Oral 70 24 94 % 5' 7\" (1.702 m) 166 lb (75.3 kg)       Physical Exam    DIAGNOSTIC RESULTS     EKG: All EKG's are interpreted by the Emergency Department Physician who either signs or Co-signsthis chart in the absence of a cardiologist.    Sinus rhythm at a rate of 71 beats a minute with no acute ST elevations or depressions or Laboratory  [address]   Phone [phone]   TROPONIN - Abnormal; Notable for the following components:    Troponin 0.03 (*)     All other components within normal limits    Narrative:     Performed at:  [facility]  [address]   Phone [phone]       All other labs were within normal range or not returned as of this dictation. EMERGENCY DEPARTMENT COURSE and DIFFERENTIAL DIAGNOSIS/MDM:   Vitals:    Vitals:    05/09/19 2100 05/09/19 2104 05/09/19 2130   BP: 100/81 100/81 (!) 111/49   Pulse: 71 70 69   Resp: 21 24 15   Temp:  97.9 °F (36.6 °C)    TempSrc:  Oral    SpO2: 93% 94% 94%   Weight:  166 lb (75.3 kg)    Height:  5' 7\" (1.702 m)        The patient has remained stable status hospital course. His chest x-ray shows a right lower lobe pneumonia. He is anemic. His troponin is slightly elevated. Given the patient's acute shortness of breath I feel is a compound of COPD and pneumonia. I spoken to Dr. Wilbert Hernandez who will be admitting the patient. He'll be started on antibiotics. MDM      REASSESSMENT              CONSULTS:  None    PROCEDURES:  Unless otherwise noted below, none     Procedures    FINAL IMPRESSION      1. Pneumonia due to organism          DISPOSITION/PLAN   DISPOSITION        PATIENT REFERREDTO:  No follow-up provider specified. DISCHARGEMEDICATIONS:  New Prescriptions    No medications on file     Controlled Substances Monitoring:     No flowsheet data found.     (Please note that portions of this note were completed with a voice recognition program.  Efforts were made to edit the dictations but occasionally words are mis-transcribed.)    Tim New MD (electronically signed)  Attending Emergency Physician          Tim New MD  05/09/19 7548

## 2019-05-10 NOTE — PLAN OF CARE
Found with soaked depends- bed linens soaked gown soaked- uriah care cleansed with bath wipes- clean bed linens applied- clean diaper applied- smear of black stool found- explained to patient need to change depends as soon as gets wet- excoriation redness to bilateral buttocks-mepitel heart shaped placed for protection on coccyx-significant other at bedside- call light in reach - bed alarm on

## 2019-05-10 NOTE — PLAN OF CARE
Awakened for vitals - oriented to person place and situation - some confusion to time- crackles left lung base NSR on tele - vitals stable- no signs of pain - no lower extremity edema - very tired wanting to rest- given warm blanket for comfort- call light in reach - bed alarm on - meal order taken

## 2019-05-10 NOTE — CARE COORDINATION
[address] Transitions Interview     5/10/2019    Patient: Natali Fischer Patient : 1929   MRN: [id number]  Reason for Admission: SOB  RARS: Readmission Risk Score: 32         Spoke with: Natali Fischer        Readmission Risk  Patient Active Problem List   Diagnosis    HTN (hypertension)    S/P CABG x 4    CAD (coronary artery disease)    Cerebral infarction ([address]    Atrial fibrillation ([address]    Unstable angina ([address]    MURPHY (dyspnea on exertion)    Hyperlipidemia    Pneumonia       Inpatient Assessment  Care Transitions Summary    Care Transitions Inpatient Review  Medication Review  Housing Review  Social Support  Durable Medical Equipment  Functional Review  Hearing and Vision  Care Transitions Interventions         Follow Up: Nurse in with patient, will attempt to see at a later time. No future appointments. Health Maintenance  There are no preventive care reminders to display for this patient.     Nimisha Montenegro RN

## 2019-05-10 NOTE — PROGRESS NOTES
Patient Active Problem List   Diagnosis    HTN (hypertension)    S/P CABG x 4    CAD (coronary artery disease)    At[facility] ([facility]    MURPHY (dyspnea on exertion)    Hyperlipidemia    Pneumonia    Chronic diastolic heart failure ([facility]   H&P dictated

## 2019-05-10 NOTE — ED NOTES
Report called to [facility] on 3t. V/u, denies questions at this time. Tele monitor in place with visual on monitors. HR 79 and in NSR. Pt taken to the floor by ED tech and belongings in tow. Pt a&o with no signs of distress. No medications infusing during time of transport. Zithromax sent up with pt, Sonal COLLINS aware.        Rafael Ruvalcaba RN  05/09/19 1292

## 2019-05-10 NOTE — PROGRESS NOTES
4 Eyes Skin Assessment     The patient is being assess for  Admission    I agree that 2 RN's have performed a thorough Head to Toe Skin Assessment on the patient. ALL assessment sites listed below have been assessed. Areas assessed by both nurses:  [x]   Head, Face, and Ears   [x]   Shoulders, Back, and Chest  [x]   Arms, Elbows, and Hands   [x]   Coccyx, Sacrum, and IschIum  [x]   Legs, Feet, and Heels        Does the Patient have Skin Breakdown?   Yes LDA WOUND CARE was Initiated documentation include the Kitty-wound, Wound Assessment, Measurements, Dressing Treatment, Drainage, and Color\",         James Prevention initiated:  No   Wound Care Orders initiated:  No      WOC nurse consulted for Pressure Injury (Stage 3,4, Unstageable, DTI, NWPT, and Complex wounds), New and Established Ostomies:  No      Nurse 1 eSignature: Electronically signed by Angeli Browne RN on 5/10/19 at 2:28 AM    **SHARE this note so that the co-signing nurse is able to place an eSignature**    Nurse 2 eSignature: Electronically signed by Tanmay Coronel RN on 5/10/19 at 5:14 AM

## 2019-05-10 NOTE — PROGRESS NOTES
Physical Therapy    Facility/Department: [address]  Initial Assessment    NAME: Arleen Alfred  : 1929  MRN: [id number]    Date of Service: 5/10/2019    Discharge Recommendations:Arnoldo Schmidt scored a 17/24 on the AM-PAC short mobility form. Current research shows that an AM-PAC score of 17 or less is typically not associated with a discharge to the patient's home setting. Based on the patients AM-PAC score and their current functional mobility deficits, it is recommended that the patient have 3-5 sessions per week of Physical Therapy at d/c to increase the patients independence. If pt refuses the above recommendations, would recommend the below services:  [address] [place] [address]: LEVEL 3 SAFETY     - Initial home health evaluation to occur within 24-48 hours, in patient home   - Therapy evaluations in home within 24-48 hours of discharge; including DME and home safety   - Frontload therapy 5 days, then 3x a week   - Therapy to evaluate if patient has [address] needs for personal care   -  evaluation within 24-48 hours, includes evaluation of resources and insurance to determine AL, [place], LTC, and Medicaid options         PT Equipment Recommendations  Equipment Needed: No    Assessment   Body structures, Functions, Activity limitations: Decreased functional mobility ; Decreased ADL status; Decreased ROM; Decreased strength;Decreased endurance;Decreased balance;Decreased sensation;Decreased vision/visual deficit  Assessment: Pt presents as below his baseline function. Pt would benefit from skilled PT services to promote safe return to PLOF. Prognosis: Good  Decision Making: Low Complexity  Patient Education: POC, role of acute PT, discharge recommendations  REQUIRES PT FOLLOW UP: Yes  Activity Tolerance  Activity Tolerance: Patient Tolerated treatment well;Patient limited by endurance       Patient Diagnosis(es): The encounter diagnosis was Pneumonia due to organism. has a past medical history of Acute MI ([facility] [address] Anemia, Arthritis, Atrial fibrillation ([facility] [address] CAD (coronary artery disease), Cancer ([facility] [address] CHF (congestive heart failure) ([facility] [address] Chronic insomnia, Chronic kidney disease, COPD (chronic obstructive pulmonary disease) ([facility] [address] Depression, GERD (gastroesophageal reflux disease), Hyperlipidemia, Hypertension, Macular degeneration, and Stroke ([facility] [address] has a past surgical history that includes back surgery; Prostate surgery; Coronary artery bypass graft (7/6/2011); Cardiac surgery; and Cystoscopy (7/1/13). Restrictions  Restrictions/Precautions  Restrictions/Precautions: Fall Risk(High fall risk)  Position Activity Restriction  Other position/activity restrictions: Marika Silva is a 80 y.o. male who presents to the emergency department shortness of breath. Dx PNA  Vision/Hearing  Vision: Impaired  Vision Exceptions: Wears glasses at all times(able to see peripherally)  Hearing: Within functional limits     Subjective  General  Chart Reviewed: Yes  Response To Previous Treatment: Not applicable  Family / Caregiver Present: No  Diagnosis: Pneumonia  Follows Commands: Within Functional Limits  General Comment  Comments: Pt sitting up in chair upon arrival, sleeping.   Subjective  Subjective: Pt agreeable to PT/OT eval.  Pain Screening  Patient Currently in Pain: Denies  Vital Signs  Patient Currently in Pain: Denies       Orientation  Orientation  Overall Orientation Status: Within Functional Limits  Social/Functional History  Social/Functional History  Lives With: Alone  Type of Home: Apartment([facility])  Home Layout: One level  Home Access: Level entry  Bathroom Shower/Tub: Walk-in shower  Bathroom Toilet: Standard  Bathroom Equipment: Built-in shower seat, Grab bars in shower, Hand-held shower  Bathroom Accessibility: Walker accessible  Home Equipment: 4 wheeled walker, Grab bars, Hospital bed  ADL Assistance: Needs assistance  Bath: Moderate assistance  Dressing: Moderate assistance  Grooming: Stand by assistance  Feeding: Modified independent   Toileting: Independent  Homemaking Assistance: (meals, laundry, cleaning by staff)  Ambulation Assistance: Independent(uses 4WW OR power chair)  Additional Comments: 4-5 falls in the last 6 months  Cognition        Objective     Observation/Palpation  Posture: Fair    AROM RLE (degrees)  RLE AROM: WFL  AROM LLE (degrees)  LLE AROM : WFL  Strength RLE  Strength RLE: WFL  Strength LLE  Strength LLE: WFL  Tone RLE  RLE Tone: Normotonic  Tone LLE  LLE Tone: Normotonic  Motor Control  Gross Motor?: WFL  Sensation  Overall Sensation Status: Impaired  Additional Comments: decreased sensation B feet  Bed mobility  Supine to Sit: Unable to assess  Sit to Supine: Contact guard assistance  Comment: Pt sitting up in chair upon arrival and returned to bed  Transfers  Sit to Stand: Minimal Assistance  Stand to sit: Contact guard assistance  Ambulation  Ambulation?: Yes  Ambulation 1  Surface: level tile  Device: Rolling Walker  Assistance: Minimal assistance;Contact guard assistance(min to CGA)  Quality of Gait: Pt ambulates with decreased step length, forward flexed posture, wide ABDIEL, overall unsteadiness of gait, occasionally striking object on L with walker, no LOB. Distance: ~40 ft  Stairs/Curb  Stairs?: No     Balance  Posture: Fair  Sitting - Static: Good;-  Sitting - Dynamic: Fair;+  Standing - Static: Fair  Standing - Dynamic: [address]  Times per week: 3-5x  Times per day: Daily  Current Treatment Recommendations: Strengthening, ROM, Balance Training, Functional Mobility Training, Transfer Training, Gait Training, Endurance Training, Safety Education & Training, Patient/Caregiver Education & Training  Safety Devices  Type of devices:  All fall risk precautions in place, Call light within reach, Bed alarm in place, Gait belt, Patient at risk for falls, Left in bed, Nurse notified, Cleveland Ortega in use  Restraints  Initially in place: No    G-Code       OutComes Score                                                  AM-PAC Score  AM-PAC Inpatient Mobility Raw Score : 17  AM-PAC Inpatient T-Scale Score : 42.13  Mobility Inpatient CMS 0-100% Score: 50.57  Mobility Inpatient CMS G-Code Modifier : CK          Goals  Short term goals  Time Frame for Short term goals:  To be met prior to discharge  Short term goal 1: Pt will complete bed mobility with mod I  Short term goal 2: Pt will complete sit to/from stand with mod I  Short term goal 3: Pt will ambulate 50 ft with LRAD and mod I       Therapy Time   Individual Concurrent Group Co-treatment   Time In 1130         Time Out 1203         Minutes 33         Timed Code Treatment Minutes: [address], PT   Charisse Kaur, [address] [facility], DPT, [id number]

## 2019-05-10 NOTE — CARE COORDINATION
Discharge Planning Assessment  RN/SW discharge planner met with patient/(and family member) to discuss reason for admission, current living situation, and potential needs at the time of discharge    Demographics/Insurance verified Yes    Current type of dwelling: Assisted Living @ [facility]    Living arrangements:  AL    Level of function/Support:    PCP: Pilar Torres MD    DME:    Active with any community resources/agencies/skilled home care:    Medication compliance issues:    Financial issues that could impact healthcare:    Transportation at the time of discharge:    Tentative discharge plan: Attempted to see patient but she was out of the room. Called and left a message to the son. Please consider ordering PT/OT for assistance with discharge planning.    Clary Perez, Case Management

## 2019-05-11 PROCEDURE — 6360000002 HC RX W HCPCS: Performed by: INTERNAL MEDICINE

## 2019-05-11 PROCEDURE — 94640 AIRWAY INHALATION TREATMENT: CPT

## 2019-05-11 PROCEDURE — 6370000000 HC RX 637 (ALT 250 FOR IP): Performed by: INTERNAL MEDICINE

## 2019-05-11 PROCEDURE — 2060000000 HC ICU INTERMEDIATE R&B

## 2019-05-11 PROCEDURE — 94760 N-INVAS EAR/PLS OXIMETRY 1: CPT

## 2019-05-11 RX ORDER — GUAIFENESIN/DEXTROMETHORPHAN 100-10MG/5
5 SYRUP ORAL EVERY 4 HOURS PRN
Status: DISCONTINUED | OUTPATIENT
Start: 2019-05-11 | End: 2019-05-13 | Stop reason: HOSPADM

## 2019-05-11 RX ADMIN — FUROSEMIDE 20 MG: 20 TABLET ORAL at 10:32

## 2019-05-11 RX ADMIN — TIOTROPIUM BROMIDE INHALATION SPRAY 2 PUFF: 3.12 SPRAY, METERED RESPIRATORY (INHALATION) at 08:12

## 2019-05-11 RX ADMIN — TROSPIUM CHLORIDE 20 MG: 20 TABLET ORAL at 18:01

## 2019-05-11 RX ADMIN — BUDESONIDE 500 MCG: 0.5 SUSPENSION RESPIRATORY (INHALATION) at 20:11

## 2019-05-11 RX ADMIN — CALCIUM CARBONATE-VITAMIN D TAB 500 MG-200 UNIT 1 TABLET: 500-200 TAB at 10:33

## 2019-05-11 RX ADMIN — FERROUS GLUCONATE TAB 324 MG (37.5 MG ELEMENTAL IRON) 324 MG: 324 (37.5 FE) TAB at 10:32

## 2019-05-11 RX ADMIN — ASPIRIN 81 MG: 81 TABLET, COATED ORAL at 10:32

## 2019-05-11 RX ADMIN — BUDESONIDE 500 MCG: 0.5 SUSPENSION RESPIRATORY (INHALATION) at 08:12

## 2019-05-11 RX ADMIN — METOPROLOL TARTRATE 25 MG: 25 TABLET, FILM COATED ORAL at 22:36

## 2019-05-11 RX ADMIN — ESCITALOPRAM OXALATE 10 MG: 10 TABLET ORAL at 10:33

## 2019-05-11 RX ADMIN — MONTELUKAST SODIUM 10 MG: 10 TABLET, FILM COATED ORAL at 22:36

## 2019-05-11 RX ADMIN — MEMANTINE HYDROCHLORIDE 10 MG: 5 TABLET ORAL at 22:36

## 2019-05-11 RX ADMIN — DOCUSATE SODIUM 100 MG: 100 CAPSULE, LIQUID FILLED ORAL at 22:35

## 2019-05-11 RX ADMIN — DOCUSATE SODIUM 100 MG: 100 CAPSULE, LIQUID FILLED ORAL at 10:32

## 2019-05-11 RX ADMIN — NYSTATIN AND TRIAMCINOLONE ACETONIDE: 100000; 1 CREAM TOPICAL at 10:37

## 2019-05-11 RX ADMIN — PANTOPRAZOLE SODIUM 40 MG: 40 TABLET, DELAYED RELEASE ORAL at 05:35

## 2019-05-11 RX ADMIN — IPRATROPIUM BROMIDE AND ALBUTEROL SULFATE 1 AMPULE: .5; 3 SOLUTION RESPIRATORY (INHALATION) at 12:27

## 2019-05-11 RX ADMIN — IPRATROPIUM BROMIDE AND ALBUTEROL SULFATE 1 AMPULE: .5; 3 SOLUTION RESPIRATORY (INHALATION) at 16:06

## 2019-05-11 RX ADMIN — FORMOTEROL FUMARATE DIHYDRATE 20 MCG: 20 SOLUTION RESPIRATORY (INHALATION) at 08:12

## 2019-05-11 RX ADMIN — HYDRALAZINE HYDROCHLORIDE 50 MG: 25 TABLET, FILM COATED ORAL at 10:33

## 2019-05-11 RX ADMIN — NYSTATIN AND TRIAMCINOLONE ACETONIDE: 100000; 1 CREAM TOPICAL at 14:22

## 2019-05-11 RX ADMIN — MULTIPLE VITAMINS W/ MINERALS TAB 1 TABLET: TAB at 10:33

## 2019-05-11 RX ADMIN — IPRATROPIUM BROMIDE AND ALBUTEROL SULFATE 1 AMPULE: .5; 3 SOLUTION RESPIRATORY (INHALATION) at 20:11

## 2019-05-11 RX ADMIN — Medication 2 PUFF: at 20:11

## 2019-05-11 RX ADMIN — IPRATROPIUM BROMIDE AND ALBUTEROL SULFATE 1 AMPULE: .5; 3 SOLUTION RESPIRATORY (INHALATION) at 08:12

## 2019-05-11 RX ADMIN — NYSTATIN AND TRIAMCINOLONE ACETONIDE: 100000; 1 CREAM TOPICAL at 22:38

## 2019-05-11 RX ADMIN — FERROUS GLUCONATE TAB 324 MG (37.5 MG ELEMENTAL IRON) 324 MG: 324 (37.5 FE) TAB at 22:35

## 2019-05-11 RX ADMIN — FORMOTEROL FUMARATE DIHYDRATE 20 MCG: 20 SOLUTION RESPIRATORY (INHALATION) at 20:11

## 2019-05-11 RX ADMIN — TAZOBACTAM SODIUM AND PIPERACILLIN SODIUM 3.38 G: 375; 3 INJECTION, SOLUTION INTRAVENOUS at 22:37

## 2019-05-11 RX ADMIN — BETAMETHASONE DIPROPIONATE: 0.5 CREAM TOPICAL at 10:36

## 2019-05-11 RX ADMIN — DONEPEZIL HYDROCHLORIDE 5 MG: 5 TABLET, FILM COATED ORAL at 22:35

## 2019-05-11 RX ADMIN — MEMANTINE HYDROCHLORIDE 10 MG: 5 TABLET ORAL at 10:33

## 2019-05-11 RX ADMIN — TAZOBACTAM SODIUM AND PIPERACILLIN SODIUM 3.38 G: 375; 3 INJECTION, SOLUTION INTRAVENOUS at 04:35

## 2019-05-11 RX ADMIN — GABAPENTIN 100 MG: 100 CAPSULE ORAL at 22:37

## 2019-05-11 RX ADMIN — TROSPIUM CHLORIDE 20 MG: 20 TABLET ORAL at 05:35

## 2019-05-11 RX ADMIN — ISOSORBIDE MONONITRATE 60 MG: 60 TABLET, EXTENDED RELEASE ORAL at 10:33

## 2019-05-11 RX ADMIN — TAZOBACTAM SODIUM AND PIPERACILLIN SODIUM 3.38 G: 375; 3 INJECTION, SOLUTION INTRAVENOUS at 15:05

## 2019-05-11 RX ADMIN — ATORVASTATIN CALCIUM 10 MG: 10 TABLET, FILM COATED ORAL at 10:32

## 2019-05-11 RX ADMIN — HYDRALAZINE HYDROCHLORIDE 50 MG: 25 TABLET, FILM COATED ORAL at 22:36

## 2019-05-11 RX ADMIN — GUAIFENESIN AND DEXTROMETHORPHAN 5 ML: 100; 10 SYRUP ORAL at 01:11

## 2019-05-11 RX ADMIN — NYSTATIN AND TRIAMCINOLONE ACETONIDE: 100000; 1 CREAM TOPICAL at 18:04

## 2019-05-11 RX ADMIN — TAMSULOSIN HYDROCHLORIDE 0.4 MG: 0.4 CAPSULE ORAL at 10:33

## 2019-05-11 RX ADMIN — BETAMETHASONE DIPROPIONATE: 0.5 CREAM TOPICAL at 22:38

## 2019-05-11 RX ADMIN — HYDRALAZINE HYDROCHLORIDE 50 MG: 25 TABLET, FILM COATED ORAL at 18:01

## 2019-05-11 RX ADMIN — BETAMETHASONE DIPROPIONATE: 0.5 CREAM TOPICAL at 15:05

## 2019-05-11 RX ADMIN — METOPROLOL TARTRATE 25 MG: 25 TABLET, FILM COATED ORAL at 10:32

## 2019-05-11 ASSESSMENT — PAIN SCALES - GENERAL
PAINLEVEL_OUTOF10: 0

## 2019-05-11 NOTE — H&P
[place] [address]                     [address] [facility], [address]                              HISTORY AND PHYSICAL    PATIENT NAME: Asa Araujo                :        1929  MED REC NO:   [id number]                          ROOM:       [id number]  ACCOUNT NO:   [de-identified]                           ADMIT DATE: 2019  PROVIDER:     Oren Pizarro MD    HISTORY OF PRESENT ILLNESS:  The patient is an 80lyearlold white  gentleman patient of Dr. Bianca Quigley from [facility] came  to the emergency room with history of progressive shortness of breath. There was no orthopnea. The patient also had some cough and congestion. There was low grade fever. There was generalized decline in appetite. No oropharyngeal dysphagia. PAST MEDICAL HISTORY:  Pertinent for COPD, atherosclerotic heart  disease, congestive heart failure, hypertension, stroke, anemia, GERD,  atrial fibrillation, macular degeneration, prostate cancer, acute MI,  chronic renal insufficiency, chronic insomnia. PAST SURGICAL HISTORY:  Pertinent for back surgery, prostatic surgery,  aortocoronary bypass graft, cystoscopy. MEDICATIONS:  Incruse Ellipta, tolterodine or Detrol, Spiriva, Flomax,  Ocean nasal saline spray, Moisturel skin cream, Zocor, Phenergan,  potassium chloride, Protonix, Zofran, Prilosec, Mycolog cream, Nitrostat  sublingual, multivitamin, Singulair, Remeron, Lopressor, Namenda, milk  of magnesia, Imodium, isosorbide mononitrate, DuoNeb, Combivent, Norco,  Apresoline, gabapentin, Lasix, omega 3 fatty acid, Allegra, ferrous  gluconate, Lexapro, Aricept, Colace, calcium with vitamin D, Symbicort,  Pulmicort, bimatoprost Lumigan, Diprolene cream, Lipitor, aspirin,  Brovana, Xanax, Ventolin, Tylenol. ALLERGIES:  The patient is allergic to MORPHINE, LEVOFLOXACIN and  CODEINE. SOCIAL HISTORY:  He is a  man. He has a significant other.   He  has been  once and  once. He has three children from his  first wife. He quit smoking 60 years ago when he was in service. He  may have hardly smoke for couple years. He would have an occasional  beer. He work for [employer] in [place]. No history  of substance abuse. Presently, he is in assisted living at [facility]. FAMILY HISTORY:  Both his parents are  of natural causes. Mother had high blood pressure. Father had high blood pressure. One  brother had some kind of cancer. REVIEW OF SYSTEMS:  Negative for loss of consciousness. No dizziness. TIA. No dysarthria. No dysphagia. No recent weight loss. The patient  is fairly well nourished. No exertion angina. Does have significant  resting and exertional shortness of breath. No orthopnea or paroxysmal  nocturnal dyspnea. No hematemesis or melena. No genitourinary  complaint. Does have chronic musculoskeletal pain. Does have some  unsteadiness and uses a walker to ambulate. Activity level is very  minimal to begin with. No intermittent claudication. PHYSICAL EXAMINATION:  GENERAL:  He is alert, awake, oriented x2 80lyear[age]old white man slightly  disoriented, overall well preserved in mental abilities. VITAL SIGNS:  Temperature 98.3, blood pressure 135/71, respirations 16,  heart rate 68. HEENT:  Oral mucosa dry. SKIN:  Warm and dry. NECK:  Supple. Faint carotid bruit. Mild jugular venous distention. LUNGS:  Bronchovesicular breathing pattern with few coarse crackles. HEART:  Irregular rate and rhythm. S1, S2.  2/6 holosystolic murmur. No gallop rhythm. ABDOMEN:  Soft and nontender. Bowel sounds present. EXTREMITIES:  Shows 1+ pitting edema. Distal pulsations are weak. NEUROLOGIC:  There are no acute focal deficits. Babinski is bilaterally  absent.     LABORATORY EVALUATION:  Shows sodium is 139, potassium 4.0, chloride  100, CO2 28, BUN 29, creatinine 1.8, anion gap is 11, blood sugar is

## 2019-05-11 NOTE — PLAN OF CARE
Problem: Falls - Risk of:  Goal: Will remain free from falls  Description  Will remain free from falls  Outcome: Ongoing  Note:   Pt remains free from falls and accidental injury at this time. Fall precautions in place, bed alarm activated, yellow blanket on bed, fall risk band on pt. Floor free from obstacles and pt encouraged to call before getting OOB and as needed. Using call light appropriately. Will continue to monitor additional needs. Problem: OXYGENATION/RESPIRATORY FUNCTION  Goal: Patient will achieve/maintain normal respiratory rate/effort  Description  Respiratory rate and effort will be within normal limits for the patient  Outcome: Ongoing  Note:   O2 sats remain >90 on RA. Will continue to monitor. Problem: HEMODYNAMIC STATUS  Goal: Patient has stable vital signs and fluid balance  Outcome: Ongoing  Note:   Vitals:    05/11/19 0115   BP: 130/63   Pulse: 90   Resp: 16   Temp: 99.3 °F (37.4 °C)   SpO2: 95%     Will continue to monitor. Problem: ACTIVITY INTOLERANCE/IMPAIRED MOBILITY  Goal: Mobility/activity is maintained at optimum level for patient  Note:   Pt able to ambulate with SBA and walker. Problem: Skin Integrity:  Intervention: Skin care  Note:   Redness and excoriation noted on pt's buttocks. Mepilex in place. Skin care performed with barrier wipes. Will continue to monitor.

## 2019-05-12 LAB
ANION GAP SERPL CALCULATED.3IONS-SCNC: 12 MMOL/L (ref 3–16)
BUN BLDV-MCNC: 33 MG/DL (ref 7–20)
CALCIUM SERPL-MCNC: 9.5 MG/DL (ref 8.3–10.6)
CHLORIDE BLD-SCNC: 101 MMOL/L (ref 99–110)
CO2: 28 MMOL/L (ref 21–32)
CREAT SERPL-MCNC: 1.9 MG/DL (ref 0.8–1.3)
GFR AFRICAN AMERICAN: 41
GFR NON-AFRICAN AMERICAN: 33
GLUCOSE BLD-MCNC: 114 MG/DL (ref 70–99)
HCT VFR BLD CALC: 28.6 % (ref 40.5–52.5)
HEMOGLOBIN: 9.9 G/DL (ref 13.5–17.5)
MCH RBC QN AUTO: 32.7 PG (ref 26–34)
MCHC RBC AUTO-ENTMCNC: 34.5 G/DL (ref 31–36)
MCV RBC AUTO: 94.8 FL (ref 80–100)
PDW BLD-RTO: 13.9 % (ref 12.4–15.4)
PLATELET # BLD: 188 K/UL (ref 135–450)
PMV BLD AUTO: 6.2 FL (ref 5–10.5)
POTASSIUM SERPL-SCNC: 4.4 MMOL/L (ref 3.5–5.1)
RBC # BLD: 3.02 M/UL (ref 4.2–5.9)
SODIUM BLD-SCNC: 141 MMOL/L (ref 136–145)
WBC # BLD: 8.5 K/UL (ref 4–11)

## 2019-05-12 PROCEDURE — 94760 N-INVAS EAR/PLS OXIMETRY 1: CPT

## 2019-05-12 PROCEDURE — 94640 AIRWAY INHALATION TREATMENT: CPT

## 2019-05-12 PROCEDURE — 80048 BASIC METABOLIC PNL TOTAL CA: CPT

## 2019-05-12 PROCEDURE — 6360000002 HC RX W HCPCS: Performed by: INTERNAL MEDICINE

## 2019-05-12 PROCEDURE — 6370000000 HC RX 637 (ALT 250 FOR IP): Performed by: INTERNAL MEDICINE

## 2019-05-12 PROCEDURE — 85027 COMPLETE CBC AUTOMATED: CPT

## 2019-05-12 PROCEDURE — 2060000000 HC ICU INTERMEDIATE R&B

## 2019-05-12 PROCEDURE — 36415 COLL VENOUS BLD VENIPUNCTURE: CPT

## 2019-05-12 RX ORDER — AMOXICILLIN AND CLAVULANATE POTASSIUM 500; 125 MG/1; MG/1
1 TABLET, FILM COATED ORAL EVERY 12 HOURS SCHEDULED
Status: DISCONTINUED | OUTPATIENT
Start: 2019-05-12 | End: 2019-05-13 | Stop reason: HOSPADM

## 2019-05-12 RX ORDER — IPRATROPIUM BROMIDE AND ALBUTEROL SULFATE 2.5; .5 MG/3ML; MG/3ML
1 SOLUTION RESPIRATORY (INHALATION) EVERY 4 HOURS PRN
Status: DISCONTINUED | OUTPATIENT
Start: 2019-05-12 | End: 2019-05-13 | Stop reason: HOSPADM

## 2019-05-12 RX ADMIN — IPRATROPIUM BROMIDE AND ALBUTEROL SULFATE 1 AMPULE: .5; 3 SOLUTION RESPIRATORY (INHALATION) at 12:30

## 2019-05-12 RX ADMIN — BUDESONIDE 500 MCG: 0.5 SUSPENSION RESPIRATORY (INHALATION) at 08:14

## 2019-05-12 RX ADMIN — ATORVASTATIN CALCIUM 10 MG: 10 TABLET, FILM COATED ORAL at 11:18

## 2019-05-12 RX ADMIN — TIOTROPIUM BROMIDE INHALATION SPRAY 2 PUFF: 3.12 SPRAY, METERED RESPIRATORY (INHALATION) at 08:14

## 2019-05-12 RX ADMIN — BETAMETHASONE DIPROPIONATE: 0.5 CREAM TOPICAL at 11:24

## 2019-05-12 RX ADMIN — NYSTATIN AND TRIAMCINOLONE ACETONIDE: 100000; 1 CREAM TOPICAL at 15:57

## 2019-05-12 RX ADMIN — FORMOTEROL FUMARATE DIHYDRATE 20 MCG: 20 SOLUTION RESPIRATORY (INHALATION) at 21:07

## 2019-05-12 RX ADMIN — MONTELUKAST SODIUM 10 MG: 10 TABLET, FILM COATED ORAL at 22:03

## 2019-05-12 RX ADMIN — BUDESONIDE 500 MCG: 0.5 SUSPENSION RESPIRATORY (INHALATION) at 21:07

## 2019-05-12 RX ADMIN — ACETAMINOPHEN 650 MG: 325 TABLET, FILM COATED ORAL at 20:34

## 2019-05-12 RX ADMIN — Medication 2 PUFF: at 21:07

## 2019-05-12 RX ADMIN — FERROUS GLUCONATE TAB 324 MG (37.5 MG ELEMENTAL IRON) 324 MG: 324 (37.5 FE) TAB at 11:18

## 2019-05-12 RX ADMIN — MEMANTINE HYDROCHLORIDE 10 MG: 5 TABLET ORAL at 22:02

## 2019-05-12 RX ADMIN — NYSTATIN AND TRIAMCINOLONE ACETONIDE: 100000; 1 CREAM TOPICAL at 18:12

## 2019-05-12 RX ADMIN — FUROSEMIDE 20 MG: 20 TABLET ORAL at 11:20

## 2019-05-12 RX ADMIN — FERROUS GLUCONATE TAB 324 MG (37.5 MG ELEMENTAL IRON) 324 MG: 324 (37.5 FE) TAB at 22:02

## 2019-05-12 RX ADMIN — HYDROCODONE BITARTRATE AND ACETAMINOPHEN 1 TABLET: 5; 325 TABLET ORAL at 22:01

## 2019-05-12 RX ADMIN — DOCUSATE SODIUM 100 MG: 100 CAPSULE, LIQUID FILLED ORAL at 11:18

## 2019-05-12 RX ADMIN — HYDRALAZINE HYDROCHLORIDE 50 MG: 25 TABLET, FILM COATED ORAL at 11:18

## 2019-05-12 RX ADMIN — HYDROCODONE BITARTRATE AND ACETAMINOPHEN 1 TABLET: 5; 325 TABLET ORAL at 00:40

## 2019-05-12 RX ADMIN — HYDRALAZINE HYDROCHLORIDE 50 MG: 25 TABLET, FILM COATED ORAL at 16:02

## 2019-05-12 RX ADMIN — DOCUSATE SODIUM 100 MG: 100 CAPSULE, LIQUID FILLED ORAL at 22:03

## 2019-05-12 RX ADMIN — ISOSORBIDE MONONITRATE 60 MG: 60 TABLET, EXTENDED RELEASE ORAL at 11:19

## 2019-05-12 RX ADMIN — BETAMETHASONE DIPROPIONATE: 0.5 CREAM TOPICAL at 22:03

## 2019-05-12 RX ADMIN — LATANOPROST 1 DROP: 50 SOLUTION OPHTHALMIC at 22:10

## 2019-05-12 RX ADMIN — NYSTATIN AND TRIAMCINOLONE ACETONIDE: 100000; 1 CREAM TOPICAL at 22:03

## 2019-05-12 RX ADMIN — DONEPEZIL HYDROCHLORIDE 5 MG: 5 TABLET, FILM COATED ORAL at 22:02

## 2019-05-12 RX ADMIN — TAMSULOSIN HYDROCHLORIDE 0.4 MG: 0.4 CAPSULE ORAL at 11:19

## 2019-05-12 RX ADMIN — NYSTATIN AND TRIAMCINOLONE ACETONIDE: 100000; 1 CREAM TOPICAL at 11:24

## 2019-05-12 RX ADMIN — AMOXICILLIN AND CLAVULANATE POTASSIUM 1 TABLET: 500; 125 TABLET, FILM COATED ORAL at 22:02

## 2019-05-12 RX ADMIN — MULTIPLE VITAMINS W/ MINERALS TAB 1 TABLET: TAB at 11:19

## 2019-05-12 RX ADMIN — ASPIRIN 81 MG: 81 TABLET, COATED ORAL at 11:19

## 2019-05-12 RX ADMIN — IPRATROPIUM BROMIDE AND ALBUTEROL SULFATE 1 AMPULE: .5; 3 SOLUTION RESPIRATORY (INHALATION) at 15:22

## 2019-05-12 RX ADMIN — FORMOTEROL FUMARATE DIHYDRATE 20 MCG: 20 SOLUTION RESPIRATORY (INHALATION) at 08:14

## 2019-05-12 RX ADMIN — TROSPIUM CHLORIDE 20 MG: 20 TABLET ORAL at 18:57

## 2019-05-12 RX ADMIN — ENOXAPARIN SODIUM 30 MG: 30 INJECTION SUBCUTANEOUS at 22:01

## 2019-05-12 RX ADMIN — TAZOBACTAM SODIUM AND PIPERACILLIN SODIUM 3.38 G: 375; 3 INJECTION, SOLUTION INTRAVENOUS at 05:45

## 2019-05-12 RX ADMIN — METOPROLOL TARTRATE 25 MG: 25 TABLET, FILM COATED ORAL at 11:19

## 2019-05-12 RX ADMIN — HYDRALAZINE HYDROCHLORIDE 50 MG: 25 TABLET, FILM COATED ORAL at 22:02

## 2019-05-12 RX ADMIN — IPRATROPIUM BROMIDE AND ALBUTEROL SULFATE 1 AMPULE: .5; 3 SOLUTION RESPIRATORY (INHALATION) at 08:14

## 2019-05-12 RX ADMIN — TROSPIUM CHLORIDE 20 MG: 20 TABLET ORAL at 05:45

## 2019-05-12 RX ADMIN — METOPROLOL TARTRATE 25 MG: 25 TABLET, FILM COATED ORAL at 22:01

## 2019-05-12 RX ADMIN — ESCITALOPRAM OXALATE 10 MG: 10 TABLET ORAL at 11:19

## 2019-05-12 RX ADMIN — MEMANTINE HYDROCHLORIDE 10 MG: 5 TABLET ORAL at 11:18

## 2019-05-12 RX ADMIN — TAZOBACTAM SODIUM AND PIPERACILLIN SODIUM 3.38 G: 375; 3 INJECTION, SOLUTION INTRAVENOUS at 16:02

## 2019-05-12 RX ADMIN — CALCIUM CARBONATE-VITAMIN D TAB 500 MG-200 UNIT 1 TABLET: 500-200 TAB at 11:19

## 2019-05-12 RX ADMIN — PANTOPRAZOLE SODIUM 40 MG: 40 TABLET, DELAYED RELEASE ORAL at 05:45

## 2019-05-12 RX ADMIN — GABAPENTIN 100 MG: 100 CAPSULE ORAL at 22:01

## 2019-05-12 ASSESSMENT — PAIN DESCRIPTION - PAIN TYPE: TYPE: ACUTE PAIN

## 2019-05-12 ASSESSMENT — PAIN SCALES - GENERAL
PAINLEVEL_OUTOF10: 0
PAINLEVEL_OUTOF10: 0
PAINLEVEL_OUTOF10: 4
PAINLEVEL_OUTOF10: 0
PAINLEVEL_OUTOF10: 8
PAINLEVEL_OUTOF10: 8

## 2019-05-12 ASSESSMENT — PAIN DESCRIPTION - DESCRIPTORS: DESCRIPTORS: HEADACHE

## 2019-05-12 ASSESSMENT — PAIN DESCRIPTION - LOCATION: LOCATION: HEAD

## 2019-05-12 NOTE — PROGRESS NOTES
Assessment completed see doc flow sheets. VSS, Patient  Reports headache 4/10, pain medication given, see MAR. Patient encouraged to call nurse if assistance is needed. Call light in reach .  SARATH GuN, RN

## 2019-05-12 NOTE — PLAN OF CARE
Problem: Falls - Risk of:   Intervention: Assess risk factors for falls  Note:   Pt is high fall risk

## 2019-05-12 NOTE — PROGRESS NOTES
Department of Internal Medicine  General Internal Medicine   Progress Note      SUBJECTIVE: breathing improvement maintained     History obtained from chart review and the patient  General ROS: positive for  - fatigue and malaise  negative for - chills, fever or night sweats  Psychological ROS: positive for - anxiety, disorientation and memory difficulties  negative for - behavioral disorder, hallucinations or hostility  Ophthalmic ROS: negative  Respiratory ROS: positive for - cough and shortness of breath  negative for - hemoptysis, stridor or wheezing  Cardiovascular ROS: no chest pain , does have  dyspnea on exertion  Gastrointestinal ROS: no abdominal pain, change in bowel habits, or black or bloody stools  Genito-Urinary ROS: no dysuria, trouble voiding, or hematuria  Musculoskeletal ROS: chronic pain   Neurological ROS: no TIA or stroke symptoms  Dermatological ROS: negative    OBJECTIVE      Medications      Current Facility-Administered Medications: amoxicillin-clavulanate (AUGMENTIN) 500-125 MG per tablet 1 tablet, 1 tablet, Oral, 2 times per day  ipratropium-albuterol (DUONEB) nebulizer solution 1 ampule, 1 ampule, Inhalation, Q4H PRN  enoxaparin (LOVENOX) injection 30 mg, 30 mg, Subcutaneous, Daily  guaiFENesin-dextromethorphan (ROBITUSSIN DM) 100-10 MG/5ML syrup 5 mL, 5 mL, Oral, Q4H PRN  EFFERDENT DENTURE CLEANSER TBEF 1 each, 1 tablet, Does not apply, Daily PRN  acetaminophen (TYLENOL) tablet 650 mg, 650 mg, Oral, Q6H PRN  albuterol sulfate  (90 Base) MCG/ACT inhaler 2 puff, 2 puff, Inhalation, Q6H PRN  formoterol (PERFOROMIST) nebulizer solution 20 mcg, 20 mcg, Nebulization, BID  aspirin EC tablet 81 mg, 81 mg, Oral, Daily  atorvastatin (LIPITOR) tablet 10 mg, 10 mg, Oral, Daily  betamethasone dipropionate (DIPROLENE) 0.05 % cream, , Topical, BID  latanoprost (XALATAN) 0.005 % ophthalmic solution 1 drop, 1 drop, Both Eyes, Nightly  budesonide (PULMICORT) nebulizer suspension 500 mcg, 500 mcg, Nebulization, BID  mometasone-formoterol (DULERA) 100-5 MCG/ACT inhaler 2 puff, 2 puff, Inhalation, BID  calcium-vitamin D (OSCAL-500) 500-200 MG-UNIT per tablet 1 tablet, 1 tablet, Oral, Daily  docusate sodium (COLACE) capsule 100 mg, 100 mg, Oral, BID  donepezil (ARICEPT) tablet 5 mg, 5 mg, Oral, Nightly  escitalopram (LEXAPRO) tablet 10 mg, 10 mg, Oral, Daily  ferrous gluconate 324 (37.5 Fe) MG tablet 324 mg, 324 mg, Oral, BID  furosemide (LASIX) tablet 20 mg, 20 mg, Oral, Daily  gabapentin (NEURONTIN) capsule 100 mg, 100 mg, Oral, Nightly  hydrALAZINE (APRESOLINE) tablet 50 mg, 50 mg, Oral, TID  HYDROcodone-acetaminophen (NORCO) 5-325 MG per tablet 1 tablet, 1 tablet, Oral, Q6H PRN  isosorbide mononitrate (IMDUR) extended release tablet 60 mg, 60 mg, Oral, Daily  loperamide (IMODIUM) capsule 2 mg, 2 mg, Oral, Q6H PRN  magnesium hydroxide (MILK OF MAGNESIA) 400 MG/5ML suspension 30 mL, 30 mL, Oral, Daily PRN  memantine (NAMENDA) tablet 10 mg, 10 mg, Oral, BID  metoprolol tartrate (LOPRESSOR) tablet 25 mg, 25 mg, Oral, BID  montelukast (SINGULAIR) tablet 10 mg, 10 mg, Oral, Nightly  therapeutic multivitamin-minerals 1 tablet, 1 tablet, Oral, Daily  nitroGLYCERIN (NITROSTAT) SL tablet 0.4 mg, 0.4 mg, Sublingual, Q5 Min PRN  nystatin-triamcinolone (MYCOLOG II) cream, , Topical, 4x Daily  ondansetron (ZOFRAN-ODT) disintegrating tablet 4 mg, 4 mg, Oral, Q8H PRN  polyethylene glycol (GLYCOLAX) packet 17 g, 17 g, Oral, Daily PRN  promethazine (PHENERGAN) tablet 25 mg, 25 mg, Oral, Q6H PRN  sodium chloride (OCEAN, BABY AYR) 0.65 % nasal spray 1 spray, 1 spray, Nasal, PRN  tamsulosin (FLOMAX) capsule 0.4 mg, 0.4 mg, Oral, Daily  trospium (SANCTURA) tablet 20 mg, 20 mg, Oral, BID AC  acetaminophen (TYLENOL) tablet 650 mg, 650 mg, Oral, Once  pantoprazole (PROTONIX) tablet 40 mg, 40 mg, Oral, QAM AC  tiotropium (SPIRIVA RESPIMAT) 2.5 MCG/ACT inhaler 2 puff, 2 puff, Inhalation, Daily  ipratropium-albuterol (DUONEB) nebulizer solution 3 mL, 1 vial, Inhalation, Q4H PRN    Physical      Vitals: /78   Pulse 88   Temp 97.3 °F (36.3 °C) (Oral)   Resp 14   Ht 5' 7\" (1.702 m)   Wt 167 lb (75.8 kg)   SpO2 97%   BMI 26.16 kg/m²   Temp: Temp: 97.3 °F (36.3 °C)  Max: Temp  Av.2 °F (36.8 °C)  Min: 97.3 °F (36.3 °C)  Max: 98.5 °F (36.9 °C)  Respiration range:  Resp  Avg: 15.5  Min: 14  Max: 16  Pulse Range:  Pulse  Av.3  Min: 85  Max: 97  Blood pressure range:  Systolic (08RVZ), ZQU:531 , Min:103 , JPQ:836   , Diastolic (11KMW), QLS:89, Min:62, Max:79    SpO2  Av.8 %  Min: 94 %  Max: 97 %    Intake/Output Summary (Last 24 hours) at 2019 191  Last data filed at 2019 1605  Gross per 24 hour   Intake 740 ml   Output 200 ml   Net 540 ml       Vent settings:  Pulse  Av.3  Min: 68  Max: 97  Resp  Av.5  Min: 14  Max: 24  SpO2  Av %  Min: 92 %  Max: 98 %    CONSTITUTIONAL:  awake, alert, cooperative, no apparent distress, and appears stated age  EYES:  Unremarkable   ENT:  normocepalic, without obvious abnormality  BACK:  symmetric and no curvature  LUNGS:  tachypneic, Mild respiratory distress, moderate air exchange, no retractions and crackles scattered , wheeze none   CARDIOVASCULAR:  normal apical pulses, regular rate and rhythm, normal S1 and S2 and no S3  ABDOMEN:  Soft BS + non tender   MUSCULOSKELETAL:  Trace edema   NEUROLOGIC:  No acute focal findings   SKIN:  Warm and dry  and no bruising or bleeding    Data      Recent Results (from the past 96 hour(s))   EKG 12 Lead    Collection Time: 19  8:58 PM   Result Value Ref Range    Ventricular Rate 71 BPM    Atrial Rate 70 BPM    QRS Duration 116 ms    Q-T Interval 444 ms    QTc Calculation (Bazett) 482 ms    R Axis 19 degrees    T Axis 30 degrees    Diagnosis       most likely normal sinus rhythmIncomplete right bundle branch blockST abnormality, possible digitalis effectProlonged QTAbnormal ECGConfirmed by Cara Richard MD, Ashley Del Rio ([id number]) on 5/10/2019 4:32:46 PM   CBC Auto Differential    Collection Time: 05/09/19  9:14 PM   Result Value Ref Range    WBC 7.7 4.0 - 11.0 K/uL    RBC 2.66 (L) 4.20 - 5.90 M/uL    Hemoglobin 8.6 (L) 13.5 - 17.5 g/dL    Hematocrit 25.6 (L) 40.5 - 52.5 %    MCV 96.2 80.0 - 100.0 fL    MCH 32.1 26.0 - 34.0 pg    MCHC 33.4 31.0 - 36.0 g/dL    RDW 13.7 12.4 - 15.4 %    Platelets 431 261 - 033 K/uL    MPV 5.9 5.0 - 10.5 fL    Neutrophils % 67.1 %    Lymphocytes % 13.3 %    Monocytes % 9.0 %    Eosinophils % 10.1 %    Basophils % 0.5 %    Neutrophils # 5.2 1.7 - 7.7 K/uL    Lymphocytes # 1.0 1.0 - 5.1 K/uL    Monocytes # 0.7 0.0 - 1.3 K/uL    Eosinophils # 0.8 (H) 0.0 - 0.6 K/uL    Basophils # 0.0 0.0 - 0.2 K/uL   Comprehensive Metabolic Panel w/ Reflex to MG    Collection Time: 05/09/19  9:14 PM   Result Value Ref Range    Sodium 138 136 - 145 mmol/L    Potassium reflex Magnesium 3.6 3.5 - 5.1 mmol/L    Chloride 106 99 - 110 mmol/L    CO2 21 21 - 32 mmol/L    Anion Gap 11 3 - 16    Glucose 117 (H) 70 - 99 mg/dL    BUN 27 (H) 7 - 20 mg/dL    CREATININE 1.4 (H) 0.8 - 1.3 mg/dL    GFR Non- 48 (A) >60    GFR  58 (A) >60    Calcium 8.0 (L) 8.3 - 10.6 mg/dL    Total Protein 5.6 (L) 6.4 - 8.2 g/dL    Alb 3.1 (L) 3.4 - 5.0 g/dL    Albumin/Globulin Ratio 1.2 1.1 - 2.2    Total Bilirubin 0.3 0.0 - 1.0 mg/dL    Alkaline Phosphatase 33 (L) 40 - 129 U/L    ALT 12 10 - 40 U/L    AST 17 15 - 37 U/L    Globulin 2.5 g/dL   Brain Natriuretic Peptide    Collection Time: 05/09/19  9:14 PM   Result Value Ref Range    Pro-BNP 1,695 (H) 0 - 449 pg/mL   Protime-INR    Collection Time: 05/09/19  9:14 PM   Result Value Ref Range    Protime 13.6 (H) 9.8 - 13.0 sec    INR 1.19 (H) 0.86 - 1.14   Troponin    Collection Time: 05/09/19  9:14 PM   Result Value Ref Range    Troponin 0.03 (H) <0.01 ng/mL   CBC auto differential    Collection Time: 05/10/19  4:40 AM   Result Value Ref Range    WBC 7.7 4.0 - 11.0 K/uL    RBC 3.24 (L) 4.20 - 5.90 M/uL    Hemoglobin 10.3 (L) 13.5 - 17.5 g/dL    Hematocrit 30.5 (L) 40.5 - 52.5 %    MCV 94.1 80.0 - 100.0 fL    MCH 31.7 26.0 - 34.0 pg    MCHC 33.7 31.0 - 36.0 g/dL    RDW 13.8 12.4 - 15.4 %    Platelets 824 481 - 162 K/uL    MPV 6.0 5.0 - 10.5 fL    Neutrophils % 64.2 %    Lymphocytes % 16.1 %    Monocytes % 8.8 %    Eosinophils % 10.2 %    Basophils % 0.7 %    Neutrophils # 4.9 1.7 - 7.7 K/uL    Lymphocytes # 1.2 1.0 - 5.1 K/uL    Monocytes # 0.7 0.0 - 1.3 K/uL    Eosinophils # 0.8 (H) 0.0 - 0.6 K/uL    Basophils # 0.1 0.0 - 0.2 K/uL   Comprehensive Metabolic Panel w/ Reflex to MG    Collection Time: 05/10/19  4:40 AM   Result Value Ref Range    Sodium 139 136 - 145 mmol/L    Potassium reflex Magnesium 4.0 3.5 - 5.1 mmol/L    Chloride 100 99 - 110 mmol/L    CO2 28 21 - 32 mmol/L    Anion Gap 11 3 - 16    Glucose 94 70 - 99 mg/dL    BUN 29 (H) 7 - 20 mg/dL    CREATININE 1.8 (H) 0.8 - 1.3 mg/dL    GFR Non- 36 (A) >60    GFR  43 (A) >60    Calcium 9.8 8.3 - 10.6 mg/dL    Total Protein 6.4 6.4 - 8.2 g/dL    Alb 3.5 3.4 - 5.0 g/dL    Albumin/Globulin Ratio 1.2 1.1 - 2.2    Total Bilirubin 0.3 0.0 - 1.0 mg/dL    Alkaline Phosphatase 36 (L) 40 - 129 U/L    ALT 14 10 - 40 U/L    AST 20 15 - 37 U/L    Globulin 2.9 g/dL   Troponin    Collection Time: 05/10/19  4:40 AM   Result Value Ref Range    Troponin 0.03 (H) <0.01 ng/mL   Basic Metabolic Panel    Collection Time: 05/12/19  5:50 AM   Result Value Ref Range    Sodium 141 136 - 145 mmol/L    Potassium 4.4 3.5 - 5.1 mmol/L    Chloride 101 99 - 110 mmol/L    CO2 28 21 - 32 mmol/L    Anion Gap 12 3 - 16    Glucose 114 (H) 70 - 99 mg/dL    BUN 33 (H) 7 - 20 mg/dL    CREATININE 1.9 (H) 0.8 - 1.3 mg/dL    GFR Non- 33 (A) >60    GFR  41 (A) >60    Calcium 9.5 8.3 - 10.6 mg/dL   CBC    Collection Time: 05/12/19  5:51 AM   Result Value Ref Range    WBC 8.5 4.0 - 11.0 K/uL    RBC 3.02 (L) 4.20 - 5.90 M/uL Hemoglobin 9.9 (L) 13.5 - 17.5 g/dL    Hematocrit 28.6 (L) 40.5 - 52.5 %    MCV 94.8 80.0 - 100.0 fL    MCH 32.7 26.0 - 34.0 pg    MCHC 34.5 31.0 - 36.0 g/dL    RDW 13.9 12.4 - 15.4 %    Platelets 676 328 - 076 K/uL    MPV 6.2 5.0 - 10.5 fL       ASSESSMENT AND PLAN     Active Problems:    HTN (hypertension)    CAD (coronary artery disease)    Hyperlipidemia    Atrial fibrillation (HCC)    Pneumonia    Chronic diastolic heart failure ([facility] [address]  Resolved Problems:    * No resolved hospital problems.  *    Does not have O2 requirement    switch to PO augmentin  PT OT eval and treat    can be dismissal ready in a,m

## 2019-05-12 NOTE — PROGRESS NOTES
Department of Internal Medicine  General Internal Medicine   Progress Note      SUBJECTIVE: feeling better breathing easier at rest     History obtained from chart review and the patient  General ROS: positive for  - fatigue and malaise  negative for - chills, fever or night sweats  Psychological ROS: positive for - anxiety, disorientation and memory difficulties  negative for - behavioral disorder, hallucinations or hostility  Ophthalmic ROS: negative  Respiratory ROS: positive for - cough and shortness of breath  negative for - hemoptysis, stridor or wheezing  Cardiovascular ROS: no chest pain , does have  dyspnea on exertion  Gastrointestinal ROS: no abdominal pain, change in bowel habits, or black or bloody stools  Genito-Urinary ROS: no dysuria, trouble voiding, or hematuria  Musculoskeletal ROS: chronic pain   Neurological ROS: no TIA or stroke symptoms  Dermatological ROS: negative    OBJECTIVE      Medications      Current Facility-Administered Medications: guaiFENesin-dextromethorphan (ROBITUSSIN DM) 100-10 MG/5ML syrup 5 mL, 5 mL, Oral, Q4H PRN  EFFERDENT DENTURE CLEANSER TBEF 1 each, 1 tablet, Does not apply, Daily PRN  acetaminophen (TYLENOL) tablet 650 mg, 650 mg, Oral, Q6H PRN  albuterol sulfate  (90 Base) MCG/ACT inhaler 2 puff, 2 puff, Inhalation, Q6H PRN  formoterol (PERFOROMIST) nebulizer solution 20 mcg, 20 mcg, Nebulization, BID  aspirin EC tablet 81 mg, 81 mg, Oral, Daily  atorvastatin (LIPITOR) tablet 10 mg, 10 mg, Oral, Daily  betamethasone dipropionate (DIPROLENE) 0.05 % cream, , Topical, BID  latanoprost (XALATAN) 0.005 % ophthalmic solution 1 drop, 1 drop, Both Eyes, Nightly  budesonide (PULMICORT) nebulizer suspension 500 mcg, 500 mcg, Nebulization, BID  mometasone-formoterol (DULERA) 100-5 MCG/ACT inhaler 2 puff, 2 puff, Inhalation, BID  calcium-vitamin D (OSCAL-500) 500-200 MG-UNIT per tablet 1 tablet, 1 tablet, Oral, Daily  docusate sodium (COLACE) capsule 100 mg, 100 mg, Oral, BID  donepezil (ARICEPT) tablet 5 mg, 5 mg, Oral, Nightly  escitalopram (LEXAPRO) tablet 10 mg, 10 mg, Oral, Daily  ferrous gluconate 324 (37.5 Fe) MG tablet 324 mg, 324 mg, Oral, BID  furosemide (LASIX) tablet 20 mg, 20 mg, Oral, Daily  gabapentin (NEURONTIN) capsule 100 mg, 100 mg, Oral, Nightly  hydrALAZINE (APRESOLINE) tablet 50 mg, 50 mg, Oral, TID  HYDROcodone-acetaminophen (NORCO) 5-325 MG per tablet 1 tablet, 1 tablet, Oral, Q6H PRN  isosorbide mononitrate (IMDUR) extended release tablet 60 mg, 60 mg, Oral, Daily  loperamide (IMODIUM) capsule 2 mg, 2 mg, Oral, Q6H PRN  magnesium hydroxide (MILK OF MAGNESIA) 400 MG/5ML suspension 30 mL, 30 mL, Oral, Daily PRN  memantine (NAMENDA) tablet 10 mg, 10 mg, Oral, BID  metoprolol tartrate (LOPRESSOR) tablet 25 mg, 25 mg, Oral, BID  montelukast (SINGULAIR) tablet 10 mg, 10 mg, Oral, Nightly  therapeutic multivitamin-minerals 1 tablet, 1 tablet, Oral, Daily  nitroGLYCERIN (NITROSTAT) SL tablet 0.4 mg, 0.4 mg, Sublingual, Q5 Min PRN  nystatin-triamcinolone (MYCOLOG II) cream, , Topical, 4x Daily  ondansetron (ZOFRAN-ODT) disintegrating tablet 4 mg, 4 mg, Oral, Q8H PRN  polyethylene glycol (GLYCOLAX) packet 17 g, 17 g, Oral, Daily PRN  promethazine (PHENERGAN) tablet 25 mg, 25 mg, Oral, Q6H PRN  sodium chloride (OCEAN, BABY AYR) 0.65 % nasal spray 1 spray, 1 spray, Nasal, PRN  tamsulosin (FLOMAX) capsule 0.4 mg, 0.4 mg, Oral, Daily  trospium (SANCTURA) tablet 20 mg, 20 mg, Oral, BID AC  piperacillin-tazobactam (ZOSYN) 3.375 g in dextrose 50 mL IVPB (premix), 3.375 g, Intravenous, Q8H  ipratropium-albuterol (DUONEB) nebulizer solution 1 ampule, 1 ampule, Inhalation, Q4H WA  acetaminophen (TYLENOL) tablet 650 mg, 650 mg, Oral, Once  pantoprazole (PROTONIX) tablet 40 mg, 40 mg, Oral, QAM AC  tiotropium (SPIRIVA RESPIMAT) 2.5 MCG/ACT inhaler 2 puff, 2 puff, Inhalation, Daily  ipratropium-albuterol (DUONEB) nebulizer solution 3 mL, 1 vial, Inhalation, Q4H PRN    Physical Vitals: /78   Pulse 88   Temp 97.3 °F (36.3 °C) (Oral)   Resp 14   Ht 5' 7\" (1.702 m)   Wt 167 lb (75.8 kg)   SpO2 97%   BMI 26.16 kg/m²   Temp: Temp: 97.3 °F (36.3 °C)  Max: Temp  Av.2 °F (36.8 °C)  Min: 97.3 °F (36.3 °C)  Max: 98.5 °F (36.9 °C)  Respiration range:  Resp  Avg: 15.5  Min: 14  Max: 16  Pulse Range:  Pulse  Av.3  Min: 85  Max: 97  Blood pressure range:  Systolic (56NPP), IJX:327 , Min:103 , EIX:067   , Diastolic (68UTA), ZGX:62, Min:62, Max:79    SpO2  Av.8 %  Min: 94 %  Max: 97 %    Intake/Output Summary (Last 24 hours) at 2019 5785  Last data filed at 2019 0623  Gross per 24 hour   Intake 100 ml   Output 200 ml   Net -100 ml       Vent settings:  Pulse  Av.3  Min: 68  Max: 97  Resp  Av.5  Min: 14  Max: 24  SpO2  Av %  Min: 92 %  Max: 98 %    CONSTITUTIONAL:  awake, alert, cooperative, no apparent distress, and appears stated age  EYES:  Unremarkable   ENT:  normocepalic, without obvious abnormality  BACK:  symmetric and no curvature  LUNGS:  tachypneic, Mild respiratory distress, moderate air exchange, no retractions and crackles scattered , wheeze none   CARDIOVASCULAR:  normal apical pulses, regular rate and rhythm, normal S1 and S2 and no S3  ABDOMEN:  Soft BS + non tender   MUSCULOSKELETAL:  Trace edema   NEUROLOGIC:  No acute focal findings   SKIN:  Warm and dry  and no bruising or bleeding    Data      Recent Results (from the past 96 hour(s))   EKG 12 Lead    Collection Time: 19  8:58 PM   Result Value Ref Range    Ventricular Rate 71 BPM    Atrial Rate 70 BPM    QRS Duration 116 ms    Q-T Interval 444 ms    QTc Calculation (Bazett) 482 ms    R Axis 19 degrees    T Axis 30 degrees    Diagnosis       most likely normal sinus rhythmIncomplete right bundle branch blockST abnormality, possible digitalis effectProlonged QTAbnormal ECGConfirmed by Soni Salvador MD, Usha Dawkins ([id number]) on 5/10/2019 4:32:46 PM   CBC Auto Differential    Collection Time: 05/09/19  9:14 PM   Result Value Ref Range    WBC 7.7 4.0 - 11.0 K/uL    RBC 2.66 (L) 4.20 - 5.90 M/uL    Hemoglobin 8.6 (L) 13.5 - 17.5 g/dL    Hematocrit 25.6 (L) 40.5 - 52.5 %    MCV 96.2 80.0 - 100.0 fL    MCH 32.1 26.0 - 34.0 pg    MCHC 33.4 31.0 - 36.0 g/dL    RDW 13.7 12.4 - 15.4 %    Platelets 644 674 - 408 K/uL    MPV 5.9 5.0 - 10.5 fL    Neutrophils % 67.1 %    Lymphocytes % 13.3 %    Monocytes % 9.0 %    Eosinophils % 10.1 %    Basophils % 0.5 %    Neutrophils # 5.2 1.7 - 7.7 K/uL    Lymphocytes # 1.0 1.0 - 5.1 K/uL    Monocytes # 0.7 0.0 - 1.3 K/uL    Eosinophils # 0.8 (H) 0.0 - 0.6 K/uL    Basophils # 0.0 0.0 - 0.2 K/uL   Comprehensive Metabolic Panel w/ Reflex to MG    Collection Time: 05/09/19  9:14 PM   Result Value Ref Range    Sodium 138 136 - 145 mmol/L    Potassium reflex Magnesium 3.6 3.5 - 5.1 mmol/L    Chloride 106 99 - 110 mmol/L    CO2 21 21 - 32 mmol/L    Anion Gap 11 3 - 16    Glucose 117 (H) 70 - 99 mg/dL    BUN 27 (H) 7 - 20 mg/dL    CREATININE 1.4 (H) 0.8 - 1.3 mg/dL    GFR Non- 48 (A) >60    GFR  58 (A) >60    Calcium 8.0 (L) 8.3 - 10.6 mg/dL    Total Protein 5.6 (L) 6.4 - 8.2 g/dL    Alb 3.1 (L) 3.4 - 5.0 g/dL    Albumin/Globulin Ratio 1.2 1.1 - 2.2    Total Bilirubin 0.3 0.0 - 1.0 mg/dL    Alkaline Phosphatase 33 (L) 40 - 129 U/L    ALT 12 10 - 40 U/L    AST 17 15 - 37 U/L    Globulin 2.5 g/dL   Brain Natriuretic Peptide    Collection Time: 05/09/19  9:14 PM   Result Value Ref Range    Pro-BNP 1,695 (H) 0 - 449 pg/mL   Protime-INR    Collection Time: 05/09/19  9:14 PM   Result Value Ref Range    Protime 13.6 (H) 9.8 - 13.0 sec    INR 1.19 (H) 0.86 - 1.14   Troponin    Collection Time: 05/09/19  9:14 PM   Result Value Ref Range    Troponin 0.03 (H) <0.01 ng/mL   CBC auto differential    Collection Time: 05/10/19  4:40 AM   Result Value Ref Range    WBC 7.7 4.0 - 11.0 K/uL    RBC 3.24 (L) 4.20 - 5.90 M/uL    Hemoglobin 10.3 (L) 13.5 - 17.5 g/dL Hematocrit 30.5 (L) 40.5 - 52.5 %    MCV 94.1 80.0 - 100.0 fL    MCH 31.7 26.0 - 34.0 pg    MCHC 33.7 31.0 - 36.0 g/dL    RDW 13.8 12.4 - 15.4 %    Platelets 529 130 - 173 K/uL    MPV 6.0 5.0 - 10.5 fL    Neutrophils % 64.2 %    Lymphocytes % 16.1 %    Monocytes % 8.8 %    Eosinophils % 10.2 %    Basophils % 0.7 %    Neutrophils # 4.9 1.7 - 7.7 K/uL    Lymphocytes # 1.2 1.0 - 5.1 K/uL    Monocytes # 0.7 0.0 - 1.3 K/uL    Eosinophils # 0.8 (H) 0.0 - 0.6 K/uL    Basophils # 0.1 0.0 - 0.2 K/uL   Comprehensive Metabolic Panel w/ Reflex to MG    Collection Time: 05/10/19  4:40 AM   Result Value Ref Range    Sodium 139 136 - 145 mmol/L    Potassium reflex Magnesium 4.0 3.5 - 5.1 mmol/L    Chloride 100 99 - 110 mmol/L    CO2 28 21 - 32 mmol/L    Anion Gap 11 3 - 16    Glucose 94 70 - 99 mg/dL    BUN 29 (H) 7 - 20 mg/dL    CREATININE 1.8 (H) 0.8 - 1.3 mg/dL    GFR Non- 36 (A) >60    GFR  43 (A) >60    Calcium 9.8 8.3 - 10.6 mg/dL    Total Protein 6.4 6.4 - 8.2 g/dL    Alb 3.5 3.4 - 5.0 g/dL    Albumin/Globulin Ratio 1.2 1.1 - 2.2    Total Bilirubin 0.3 0.0 - 1.0 mg/dL    Alkaline Phosphatase 36 (L) 40 - 129 U/L    ALT 14 10 - 40 U/L    AST 20 15 - 37 U/L    Globulin 2.9 g/dL   Troponin    Collection Time: 05/10/19  4:40 AM   Result Value Ref Range    Troponin 0.03 (H) <0.01 ng/mL   Basic Metabolic Panel    Collection Time: 05/12/19  5:50 AM   Result Value Ref Range    Sodium 141 136 - 145 mmol/L    Potassium 4.4 3.5 - 5.1 mmol/L    Chloride 101 99 - 110 mmol/L    CO2 28 21 - 32 mmol/L    Anion Gap 12 3 - 16    Glucose 114 (H) 70 - 99 mg/dL    BUN 33 (H) 7 - 20 mg/dL    CREATININE 1.9 (H) 0.8 - 1.3 mg/dL    GFR Non- 33 (A) >60    GFR  41 (A) >60    Calcium 9.5 8.3 - 10.6 mg/dL   CBC    Collection Time: 05/12/19  5:51 AM   Result Value Ref Range    WBC 8.5 4.0 - 11.0 K/uL    RBC 3.02 (L) 4.20 - 5.90 M/uL    Hemoglobin 9.9 (L) 13.5 - 17.5 g/dL    Hematocrit 28.6 (L) 40.5 - 52.5 %    MCV 94.8 80.0 - 100.0 fL    MCH 32.7 26.0 - 34.0 pg    MCHC 34.5 31.0 - 36.0 g/dL    RDW 13.9 12.4 - 15.4 %    Platelets 840 739 - 297 K/uL    MPV 6.2 5.0 - 10.5 fL       ASSESSMENT AND PLAN     Active Problems:    HTN (hypertension)    CAD (coronary artery disease)    Hyperlipidemia    Atrial fibrillation (HCC)    Pneumonia    Chronic diastolic heart failure ([address]  Resolved Problems:    * No resolved hospital problems.  *    IV  Antibiotics , nebulizer treatment    supplemental O2   Inhaled corticosteroid  DVT prophylaxis

## 2019-05-13 VITALS
RESPIRATION RATE: 16 BRPM | BODY MASS INDEX: 27.26 KG/M2 | WEIGHT: 173.7 LBS | OXYGEN SATURATION: 96 % | DIASTOLIC BLOOD PRESSURE: 79 MMHG | HEIGHT: 67 IN | HEART RATE: 75 BPM | SYSTOLIC BLOOD PRESSURE: 132 MMHG | TEMPERATURE: 97.6 F

## 2019-05-13 PROBLEM — G89.29 OTHER CHRONIC PAIN: Status: ACTIVE | Noted: 2019-05-13

## 2019-05-13 PROCEDURE — 6360000002 HC RX W HCPCS: Performed by: INTERNAL MEDICINE

## 2019-05-13 PROCEDURE — 94640 AIRWAY INHALATION TREATMENT: CPT

## 2019-05-13 PROCEDURE — 6370000000 HC RX 637 (ALT 250 FOR IP): Performed by: INTERNAL MEDICINE

## 2019-05-13 PROCEDURE — 94760 N-INVAS EAR/PLS OXIMETRY 1: CPT

## 2019-05-13 RX ORDER — AMOXICILLIN AND CLAVULANATE POTASSIUM 500; 125 MG/1; MG/1
1 TABLET, FILM COATED ORAL EVERY 12 HOURS SCHEDULED
Qty: 20 TABLET | Refills: 0 | Status: SHIPPED | OUTPATIENT
Start: 2019-05-13 | End: 2019-05-23

## 2019-05-13 RX ORDER — GUAIFENESIN/DEXTROMETHORPHAN 100-10MG/5
5 SYRUP ORAL EVERY 4 HOURS PRN
Qty: 120 ML | Refills: 1 | Status: SHIPPED | OUTPATIENT
Start: 2019-05-13 | End: 2019-05-23

## 2019-05-13 RX ORDER — HYDROCODONE BITARTRATE AND ACETAMINOPHEN 5; 325 MG/1; MG/1
0.5 TABLET ORAL EVERY 6 HOURS PRN
Qty: 10 TABLET | Refills: 0 | Status: SHIPPED | OUTPATIENT
Start: 2019-05-13 | End: 2019-05-18

## 2019-05-13 RX ADMIN — AMOXICILLIN AND CLAVULANATE POTASSIUM 1 TABLET: 500; 125 TABLET, FILM COATED ORAL at 11:22

## 2019-05-13 RX ADMIN — Medication 2 PUFF: at 07:55

## 2019-05-13 RX ADMIN — ESCITALOPRAM OXALATE 10 MG: 10 TABLET ORAL at 11:22

## 2019-05-13 RX ADMIN — ASPIRIN 81 MG: 81 TABLET, COATED ORAL at 11:21

## 2019-05-13 RX ADMIN — MEMANTINE HYDROCHLORIDE 10 MG: 5 TABLET ORAL at 11:19

## 2019-05-13 RX ADMIN — HYDRALAZINE HYDROCHLORIDE 50 MG: 25 TABLET, FILM COATED ORAL at 11:21

## 2019-05-13 RX ADMIN — MULTIPLE VITAMINS W/ MINERALS TAB 1 TABLET: TAB at 11:20

## 2019-05-13 RX ADMIN — METOPROLOL TARTRATE 25 MG: 25 TABLET, FILM COATED ORAL at 11:18

## 2019-05-13 RX ADMIN — IPRATROPIUM BROMIDE AND ALBUTEROL SULFATE 1 AMPULE: .5; 3 SOLUTION RESPIRATORY (INHALATION) at 07:52

## 2019-05-13 RX ADMIN — FERROUS GLUCONATE TAB 324 MG (37.5 MG ELEMENTAL IRON) 324 MG: 324 (37.5 FE) TAB at 11:19

## 2019-05-13 RX ADMIN — TIOTROPIUM BROMIDE INHALATION SPRAY 2 PUFF: 3.12 SPRAY, METERED RESPIRATORY (INHALATION) at 07:55

## 2019-05-13 RX ADMIN — BUDESONIDE 500 MCG: 0.5 SUSPENSION RESPIRATORY (INHALATION) at 07:52

## 2019-05-13 RX ADMIN — TROSPIUM CHLORIDE 20 MG: 20 TABLET ORAL at 05:59

## 2019-05-13 RX ADMIN — DOCUSATE SODIUM 100 MG: 100 CAPSULE, LIQUID FILLED ORAL at 11:22

## 2019-05-13 RX ADMIN — ATORVASTATIN CALCIUM 10 MG: 10 TABLET, FILM COATED ORAL at 11:20

## 2019-05-13 RX ADMIN — ISOSORBIDE MONONITRATE 60 MG: 60 TABLET, EXTENDED RELEASE ORAL at 11:20

## 2019-05-13 RX ADMIN — CALCIUM CARBONATE-VITAMIN D TAB 500 MG-200 UNIT 1 TABLET: 500-200 TAB at 11:22

## 2019-05-13 RX ADMIN — NYSTATIN AND TRIAMCINOLONE ACETONIDE: 100000; 1 CREAM TOPICAL at 11:23

## 2019-05-13 RX ADMIN — FUROSEMIDE 20 MG: 20 TABLET ORAL at 11:21

## 2019-05-13 RX ADMIN — BETAMETHASONE DIPROPIONATE: 0.5 CREAM TOPICAL at 11:23

## 2019-05-13 RX ADMIN — TAMSULOSIN HYDROCHLORIDE 0.4 MG: 0.4 CAPSULE ORAL at 11:22

## 2019-05-13 RX ADMIN — PANTOPRAZOLE SODIUM 40 MG: 40 TABLET, DELAYED RELEASE ORAL at 05:58

## 2019-05-13 NOTE — CARE COORDINATION
Patient discharged 5-13-19, Notified Sylvia Talley at [facility] that patient is returning at 1:15 pm via wheelchair today. Patient/Family aware of and agreeable to the discharge plan.   All discharge needs met per case management

## 2019-05-13 NOTE — PROGRESS NOTES
Shift assessment complete, meds given whole with ensure. Pt has been calm and cooperative, did have some confusion tonight and needed redirection. Pt had a complaint of headache, meds given. Vitals stable. No other concerns, will continue to monitor.

## 2019-05-13 NOTE — CARE COORDINATION
Met with patient to offer assistance with discharge and potential transport back to [facility], if medically necessary. Patient thinks hi friend, Jose Olivarez will make arrangements.

## 2019-05-13 NOTE — PROGRESS NOTES
Data- discharge order received, pt verbalized agreement to discharge, disposition to previous residence, no needs for HHC/DME. Action- discharge instructions prepared and given to Pt, pt verbalized understanding. Medication information packet given r/t NEW and/or CHANGED prescriptions emphasizing name/purpose/side effects, pt verbalized understanding. Discharge instruction summary: Diet- General, Activity- As tolterated, Primary Care Physician as follows: [facility] [phone] f/u appointment in one week , immunizations reviewed and refused, prescription medications filled [place] and paper prescription in hand . Inpatient surgical procedure precautions reviewed: 61 CHF Education reviewed. Pt/ Family has had a total of 60 minutes CHF education this admission encounter. Response- Pt belongings gathered, IV removed. Disposition is home (no HHC/DME needs), transported with Wheel chair company , taken to lobby via w/c w/ Attendant. no complications.

## 2019-05-13 NOTE — DISCHARGE INSTR - COC
Continuity of Care Form    Patient Name: Bony Stevens   :  1929  MRN:  [id number]    Admit date:  2019  Discharge date:  2019    Code Status Order: Full Code   Advance Directives:   [facility] Documentation     Date/Time Healthcare Directive Type of Healthcare Directive Copy in [facility] [address] Agent's Name Healthcare Agent's Phone Number    05/10/19 1606  No, patient does not have an advance directive for healthcare treatment -- -- -- -- --          Admitting Physician:  Sandy Jordan MD  PCP: Lamonte Lee MD    Discharging Nurse: [facility] Unit/Room#: [id number]  Discharging Unit Phone Number: ***    Emergency Contact:   Extended Emergency Contact Information  Primary Emergency Contact: [address] [place] [address] Phone: [phone]  Mobile Phone: [phone]  Relation: Child  Secondary Emergency Contact: Salud Mckinnon  Address: [address] [place] [address],[address], [address] [facility]  Home Phone: [phone]  Relation: Other    Past Surgical History:  Past Surgical History:   Procedure Laterality Date    BACK SURGERY      CARDIAC SURGERY      bypass     CORONARY ARTERY BYPASS GRAFT  2011    Quadruple    CYSTOSCOPY  13    PROSTATE SURGERY      seed placement       Immunization History:   Immunization History   Administered Date(s) Administered    Influenza Virus Vaccine 10/03/2011    Pneumococcal Polysaccharide ([id number]) 10/03/2011       Active Problems:  Patient Active Problem List   Diagnosis Code    HTN (hypertension) I10    S/P CABG x 4 Z95.1    CAD (coronary artery disease) I25.10    Atrial fibrillation ([address] I48.91    MURPHY (dyspnea on exertion) R06.09    Hyperlipidemia E78.5    Pneumonia J18.9    Chronic diastolic heart failure (HCC) I50.32    Other chronic pain G89.29       Isolation/Infection:   Isolation          No Isolation            Nurse Assessment:  Last Vital Signs: /79   Pulse 75   Temp 97.6 °F (36.4 °C) (Oral)   Resp 16   Ht 5' 7\" (1.702 m)   Wt 173 lb 11.2 oz (78.8 kg)   SpO2 96%   BMI 27.21 kg/m²     Last documented pain score (0-10 scale): Pain Level: 8  Last Weight:   Wt Readings from Last 1 Encounters:   05/13/19 173 lb 11.2 oz (78.8 kg)     Mental Status:  {IP PT MENTAL STATUS:81685}    IV Access:  { CIRILO IV ACCESS:604066125}    Nursing Mobility/ADLs:  Walking   {P DME YFTR:999834724}  Transfer  {P DME VRGV:691615869}  Bathing  {P DME KRSZ:256117769}  Dressing  {P DME RSIB:774975807}  Toileting  {P DME GVOR:423279878}  Feeding  {P DME QLLM:871583226}  Med Admin  {[facility] DME XEVW:313720185}  Med Delivery   {[facility] MED Delivery:293534787}    Wound Care Documentation and Therapy:  Wound 05/10/19 Arm Left;Upper 6cm x 5cm (Active)   Wound Skin Tear 5/13/2019 10:18 AM   Dressing Status New drainage 5/13/2019 10:18 AM   Dressing Changed Changed/New 5/13/2019 10:18 AM   Dressing/Treatment Other (comment) 5/13/2019 10:18 AM   Wound Cleansed Rinsed/Irrigated with saline 5/13/2019 10:18 AM   Dressing Change Due 05/13/19 5/13/2019 10:18 AM   Wound Length (cm) 6 cm 5/13/2019 10:18 AM   Wound Width (cm) 5 cm 5/13/2019 10:18 AM   Wound Depth (cm) 0 cm 5/13/2019 10:18 AM   Wound Surface Area (cm^2) 30 cm^2 5/13/2019 10:18 AM   Change in Wound Size % (l*w) 0 5/13/2019 10:18 AM   Wound Volume (cm^3) 0 cm^3 5/13/2019 10:18 AM   Wound Assessment Bleeding 5/13/2019 10:18 AM   Drainage Amount Moderate 5/13/2019 10:18 AM   Drainage Description Other (Comment) 5/13/2019 10:18 AM   Odor None 5/13/2019 10:18 AM   Margins Attached edges 5/13/2019 10:18 AM   Kitty-wound Assessment Clean;Dry 5/10/2019  9:53 PM   Number of days: 3        Elimination:  Continence:   · Bowel: {YES / SK:12804}  · Bladder: {YES / UB:79770}  Urinary Catheter: {Urinary Catheter:967093185}   Colostomy/Ileostomy/Ileal Conduit: {YES / VX:27959}       Date of Last BM: ***    Intake/Output

## 2019-05-13 NOTE — PLAN OF CARE
Problem: Falls - Risk of:  Goal: Will remain free from falls  Description  Will remain free from falls  Outcome: Ongoing  Goal: Absence of physical injury  Description  Absence of physical injury  Outcome: Ongoing     Problem: OXYGENATION/RESPIRATORY FUNCTION  Goal: Patient will maintain patent airway  Outcome: Ongoing  Goal: Patient will achieve/maintain normal respiratory rate/effort  Description  Respiratory rate and effort will be within normal limits for the patient  Outcome: Ongoing     Problem: HEMODYNAMIC STATUS  Goal: Patient has stable vital signs and fluid balance  Outcome: Ongoing     Problem: ACTIVITY INTOLERANCE/IMPAIRED MOBILITY  Goal: Mobility/activity is maintained at optimum level for patient  Outcome: Ongoing     Problem: Respiratory:  Goal: Absence of aspiration  Description  Absence of aspiration  Outcome: Ongoing     Problem: Pain:  Goal: Pain level will decrease  Description  Pain level will decrease  Outcome: Ongoing  Goal: Control of acute pain  Description  Control of acute pain  Outcome: Ongoing  Goal: Control of chronic pain  Description  Control of chronic pain  Outcome: Ongoing

## 2019-05-13 NOTE — CARE COORDINATION
Spoke with RN. Pt has evulsion/skin tear to left upper arm. Had been dry, now bleeding. recommend trying aquacel, ABD and kerlix. Pt is on lovenox.  If that does not stop bleeding, then would recommend gel foam. Freddy Moreno, MSN, RN, [facility]

## 2019-05-14 ENCOUNTER — CARE COORDINATION (OUTPATIENT)
Dept: CASE MANAGEMENT | Age: 84
End: 2019-05-14

## 2019-05-15 NOTE — CARE COORDINATION
[address] Transitions Initial Follow Up Call    Call within 2 business days of discharge: Yes    Patient: Leslee Cool Patient : 1929   MRN: [id number]  Reason for Admission: Pneumonia  Discharge Date: 19 RARS: Readmission Risk Score: 27      Last Discharge [facility]       Complaint Diagnosis Description Type Department Provider    19 Shortness of Breath Pneumonia due to organism . .. ED to Hosp-Admission (Discharged) (ADMITTED) Khoi Bell MD; Sheldon Dixon. .. Spoke with: Rachel Meza: CHAUNCEY    Non-face-to-face services provided:  Obtained and reviewed discharge summary and/or continuity of care documents    Care Transitions 24 Hour Call    Do you have any ongoing symptoms?:  No  Do you have a copy of your discharge instructions?:  Yes  Do you have all of your prescriptions and are they filled?:  Yes  Have you been contacted by a [facility] Pharmacist?:  No  Were you discharged with any Home Care or Post Acute Services:  No  Do you feel like you have everything you need to keep you well at home?:  Yes  Care Transitions Interventions         Follow Up: Son reports that patient is doing well. [facility] attempted to get an alternate phone number for patient, he does not have alternate number. [facility] explained to son that patient could not be reached at this contact number. Son did not have details on patient condition, he does reside in [place] at [facility]. Son did state that patient has all of his medications and does not have any new prescriptions. CTC will transition to Central Team for follow up. No future appointments.     Jaylen Field RN

## 2019-05-15 NOTE — PROGRESS NOTES
Physical Therapy  Physical Therapy Discharge Summary    Name: Darnell Zaidi  : 1929    The pt was evaluated by PT on 5/10/19 and seen for 0 treatment sessions prior to DC to [facility]  on 19 per MD order. The pt's acute therapy goals were:  Short term goals  Time Frame for Short term goals: To be met prior to discharge  Short term goal 1: Pt will complete bed mobility with mod I  Short term goal 2: Pt will complete sit to/from stand with mod I  Short term goal 3: Pt will ambulate 50 ft with LRAD and mod I       Patient met 0 goals during stay. Number of Refusals:0  Number of Holds: 0  During this hospitalization, the patient was educated on:  Patient Education: POC, role of acute PT, discharge recommendations    DC pt from PT caseload at this time. Thank you!     Thanks, Scott Orozco [place], DPT [phone]

## 2019-06-17 NOTE — DISCHARGE SUMMARY
[address]                     [facility], [address]                               DISCHARGE SUMMARY    PATIENT NAME: Chauncey Hauser                :        1929  MED REC NO:   [id number]                          ROOM:       6042  ACCOUNT NO:   [de-identified]                           ADMIT DATE: 2019  PROVIDER:     Merilyn Alpers, MD                  DISCHARGE DATE:  2019    FINAL DIAGNOSES:  1. Pneumonia. 2.  Chronic diastolic heart failure. 3.  Atherosclerotic heart disease. 4.  Status post aortocoronary bypass graft. 5.  Hypertension. 6.  Chronic atrial fibrillation. DISCHARGE MEDICATIONS:  1. Norco 5/325 half tablet every 6 hours p.r.n.  2.  Dextromethorphan with guaifenesin 5 mL every 4 hours as needed for  cough. 3.  Augmentin 500 mg p.o. b.i.d. for 10 days. 4.  Incruse Ellipta one puff into lung daily. 5.  Aricept 5 mg once a day. 6.  Atorvastatin 10 mg once a day. 7.  Brovana _____ nebulization two times a day. 8.  Sodium chloride two spray each nostril daily. 9.  Lexapro 20 mg daily. 10.  Namenda 10 mg two times a day. 11.  MiraLax powder 17 gm once a day. 12.  Mycolog cream as directed. 13.  Montelukast 10 mg daily. 14.  Spiriva 18 mcg once a day HandiHaler. 15.  Symbicort two puffs twice a day 80/4.5. 16.  Ondansetron 4 mg q.i.d. p.r.n.  17.  Detrol 2 mg p.o. b.i.d.  18.  Gabapentin 100 mg nightly. 19.  Calcium carbonate with vitamin D two times a day. 20.  Diprolene cream as directed 0.05%. 21.  Aspirin 81 mg once a day. 22.  Isosorbide mononitrate 60 mg daily. 23.  Multivitamin once a day. 24.  Docusate 100 mg p.o. b.i.d.  25.  Nitrostat sublingual p.r.n.  26.  Hydralazine 50 mg p.o. b.i.d.  27.  Metoprolol 50 mg twice a day. 28.  Flomax 0.4 mg nightly. 29.  Protonix 40 mg once a day. 30.  Ferrous gluconate 325 daily.     HOSPITAL COURSE:  This elderly gentleman came to the emergency room

## 2019-06-17 NOTE — PROGRESS NOTES
Patient seen , discharge dictated scripts given , arrangements made , CIRILO completed .  Discussed with nursing staff  And   If applicable ,  Discussed with  Patient's family , all questions answered and concerns addressed  When applicable

## 2020-09-28 ENCOUNTER — APPOINTMENT (OUTPATIENT)
Dept: GENERAL RADIOLOGY | Age: 85
End: 2020-09-28
Payer: MEDICARE

## 2020-09-28 ENCOUNTER — HOSPITAL ENCOUNTER (EMERGENCY)
Age: 85
Discharge: HOME | End: 2020-09-28
Attending: EMERGENCY MEDICINE
Payer: MEDICARE

## 2020-09-28 ENCOUNTER — APPOINTMENT (OUTPATIENT)
Dept: CT IMAGING | Age: 85
End: 2020-09-28
Payer: MEDICARE

## 2020-09-28 VITALS
BODY MASS INDEX: 27.15 KG/M2 | TEMPERATURE: 98.7 F | HEIGHT: 67 IN | OXYGEN SATURATION: 98 % | DIASTOLIC BLOOD PRESSURE: 71 MMHG | SYSTOLIC BLOOD PRESSURE: 133 MMHG | HEART RATE: 84 BPM | RESPIRATION RATE: 20 BRPM | WEIGHT: 173 LBS

## 2020-09-28 LAB
A/G RATIO: 1.1 (ref 1.1–2.2)
ALBUMIN SERPL-MCNC: 2.9 G/DL (ref 3.4–5)
ALP BLD-CCNC: 43 U/L (ref 40–129)
ALT SERPL-CCNC: 19 U/L (ref 10–40)
ANION GAP SERPL CALCULATED.3IONS-SCNC: 11 MMOL/L (ref 3–16)
AST SERPL-CCNC: 22 U/L (ref 15–37)
BASOPHILS ABSOLUTE: 0 K/UL (ref 0–0.2)
BASOPHILS RELATIVE PERCENT: 0.4 %
BILIRUB SERPL-MCNC: 0.4 MG/DL (ref 0–1)
BILIRUBIN URINE: NEGATIVE
BLOOD, URINE: NEGATIVE
BUN BLDV-MCNC: 19 MG/DL (ref 7–20)
CALCIUM SERPL-MCNC: 9.1 MG/DL (ref 8.3–10.6)
CHLORIDE BLD-SCNC: 109 MMOL/L (ref 99–110)
CLARITY: ABNORMAL
CO2: 21 MMOL/L (ref 21–32)
COLOR: YELLOW
CREAT SERPL-MCNC: 1.9 MG/DL (ref 0.8–1.3)
EOSINOPHILS ABSOLUTE: 0.4 K/UL (ref 0–0.6)
EOSINOPHILS RELATIVE PERCENT: 4.7 %
EPITHELIAL CELLS, UA: 2 /HPF (ref 0–5)
GFR AFRICAN AMERICAN: 40
GFR NON-AFRICAN AMERICAN: 33
GLOBULIN: 2.7 G/DL
GLUCOSE BLD-MCNC: 88 MG/DL (ref 70–99)
GLUCOSE URINE: NEGATIVE MG/DL
HCT VFR BLD CALC: 30.7 % (ref 40.5–52.5)
HEMOGLOBIN: 10.5 G/DL (ref 13.5–17.5)
HYALINE CASTS: 4 /LPF (ref 0–8)
KETONES, URINE: NEGATIVE MG/DL
LEUKOCYTE ESTERASE, URINE: ABNORMAL
LYMPHOCYTES ABSOLUTE: 1.3 K/UL (ref 1–5.1)
LYMPHOCYTES RELATIVE PERCENT: 14.1 %
MAGNESIUM: 1.9 MG/DL (ref 1.8–2.4)
MCH RBC QN AUTO: 33 PG (ref 26–34)
MCHC RBC AUTO-ENTMCNC: 34.1 G/DL (ref 31–36)
MCV RBC AUTO: 96.9 FL (ref 80–100)
MICROSCOPIC EXAMINATION: YES
MONOCYTES ABSOLUTE: 0.8 K/UL (ref 0–1.3)
MONOCYTES RELATIVE PERCENT: 8.5 %
NEUTROPHILS ABSOLUTE: 6.6 K/UL (ref 1.7–7.7)
NEUTROPHILS RELATIVE PERCENT: 72.3 %
NITRITE, URINE: NEGATIVE
PDW BLD-RTO: 13.6 % (ref 12.4–15.4)
PH UA: 5 (ref 5–8)
PLATELET # BLD: 251 K/UL (ref 135–450)
PMV BLD AUTO: 7 FL (ref 5–10.5)
POTASSIUM SERPL-SCNC: 3.8 MMOL/L (ref 3.5–5.1)
PROTEIN UA: ABNORMAL MG/DL
RBC # BLD: 3.17 M/UL (ref 4.2–5.9)
RBC UA: 3 /HPF (ref 0–4)
SODIUM BLD-SCNC: 141 MMOL/L (ref 136–145)
SPECIFIC GRAVITY UA: 1.01 (ref 1–1.03)
TOTAL CK: 33 U/L (ref 39–308)
TOTAL PROTEIN: 5.6 G/DL (ref 6.4–8.2)
TROPONIN: 0.07 NG/ML
TROPONIN: 0.07 NG/ML
URINE REFLEX TO CULTURE: YES
URINE TYPE: ABNORMAL
UROBILINOGEN, URINE: 0.2 E.U./DL
WBC # BLD: 9.1 K/UL (ref 4–11)
WBC UA: 18 /HPF (ref 0–5)
YEAST: PRESENT /HPF

## 2020-09-28 PROCEDURE — 6370000000 HC RX 637 (ALT 250 FOR IP)

## 2020-09-28 PROCEDURE — 70450 CT HEAD/BRAIN W/O DYE: CPT

## 2020-09-28 PROCEDURE — 87086 URINE CULTURE/COLONY COUNT: CPT

## 2020-09-28 PROCEDURE — 99285 EMERGENCY DEPT VISIT HI MDM: CPT

## 2020-09-28 PROCEDURE — 85025 COMPLETE CBC W/AUTO DIFF WBC: CPT

## 2020-09-28 PROCEDURE — 93005 ELECTROCARDIOGRAM TRACING: CPT | Performed by: PHYSICIAN ASSISTANT

## 2020-09-28 PROCEDURE — 71045 X-RAY EXAM CHEST 1 VIEW: CPT

## 2020-09-28 PROCEDURE — 96374 THER/PROPH/DIAG INJ IV PUSH: CPT

## 2020-09-28 PROCEDURE — 81001 URINALYSIS AUTO W/SCOPE: CPT

## 2020-09-28 PROCEDURE — 84484 ASSAY OF TROPONIN QUANT: CPT

## 2020-09-28 PROCEDURE — 82550 ASSAY OF CK (CPK): CPT

## 2020-09-28 PROCEDURE — 6360000002 HC RX W HCPCS: Performed by: PHYSICIAN ASSISTANT

## 2020-09-28 PROCEDURE — 80053 COMPREHEN METABOLIC PANEL: CPT

## 2020-09-28 PROCEDURE — 83735 ASSAY OF MAGNESIUM: CPT

## 2020-09-28 PROCEDURE — 72125 CT NECK SPINE W/O DYE: CPT

## 2020-09-28 RX ORDER — BRIMONIDINE TARTRATE 2 MG/ML
1 SOLUTION/ DROPS OPHTHALMIC 3 TIMES DAILY
COMMUNITY

## 2020-09-28 RX ORDER — LORAZEPAM 2 MG/ML
0.5 INJECTION INTRAMUSCULAR ONCE
Status: COMPLETED | OUTPATIENT
Start: 2020-09-28 | End: 2020-09-28

## 2020-09-28 RX ORDER — ACETAMINOPHEN 500 MG
1000 TABLET ORAL ONCE
Status: COMPLETED | OUTPATIENT
Start: 2020-09-28 | End: 2020-09-28

## 2020-09-28 RX ORDER — ACETAMINOPHEN 500 MG
TABLET ORAL
Status: COMPLETED
Start: 2020-09-28 | End: 2020-09-28

## 2020-09-28 RX ADMIN — LORAZEPAM 0.5 MG: 2 INJECTION, SOLUTION INTRAMUSCULAR; INTRAVENOUS at 16:20

## 2020-09-28 RX ADMIN — ACETAMINOPHEN 1000 MG: 500 TABLET ORAL at 13:55

## 2020-09-28 RX ADMIN — Medication 1000 MG: at 13:55

## 2020-09-28 ASSESSMENT — PAIN SCALES - GENERAL: PAINLEVEL_OUTOF10: 6

## 2020-09-28 NOTE — ED PROVIDER NOTES
I independently performed a history and physical on Belinda Fernandez. All diagnostic, treatment, and disposition decisions were made by myself in conjunction with the advanced practice provider. Briefly, this is a 80 y.o. male here for confusion per F staff. Patient denies this. He states he has no complaints and feels well at this time. Nursing staff does report he fell 2 days ago and was found on the floor again today. There was a report the patient had wrapped a cord around his neck. When asked why he did this, the patient laughs and states he was \"messing around with his nurses. \" He specifically denies suicidal intent or ideation when asked by myself and other ED staff members. On exam, the patient appears well-hydrated, well-nourished, and in no acute distress. Mucous membranes are moist. Speech is clear. Breathing is unlabored. Skin is dry. Mental status is normal. The patient moves all extremities. Face is symmetric without droop. Heart is RRR. Lungs are CTAB. EKG  The Ekg interpreted by me in the absence of a cardiologist shows. normal sinus rhythm with a rate of 86  Axis is   Normal  QTc is  normal  RBBB  New T-wave inversions in the anterolateral leads  Hemoglobin very low  Right bundle branch block  Comparison to previous EKG from 5/9/2019    [facility]  Patient contracts for safety. He exhibits a happy mood and normal affect and does not seem depressed. I believe him when he reports he is not suicidal and do not believe he is a danger to himself or others. Patient Referrals: Cj Mcintosh MD    Schedule an appointment as soon as possible for a visit in 3 days  For re-check    [facility] Emergency Department  [address] [place] [address]  [phone]    As needed      FINAL IMPRESSION  1. Fall, initial encounter    2.  Chronic kidney disease, unspecified CKD stage        Blood pressure 133/71, pulse 84, temperature 98.7 °F (37.1 °C), temperature source Oral, resp. rate 20, height 5' 7\" (1.702 m), weight 173 lb (78.5 kg), SpO2 98 %.      For further details of [facility] emergency department encounter, please see documentation by advanced practice provider, CORY Coelho.          Raymond Morrison MD  10/08/20 0642

## 2020-09-28 NOTE — ED NOTES
Bed: 07  Expected date:   Expected time:   Means of arrival:   Comments:  [address], RN  09/28/20 6460

## 2020-09-28 NOTE — ED NOTES
Pt in from ECF by squad with complaints of increased agitation and combative behaviors with ECF staff. Per staff he had an unwitnessed fall this morning and was found at the side of the bed, no injuries noted or complained of by patient. Pt states he did strike the back of his head but no pain. Staff states patient is also refusing all vitals meds and care from them. Pt has had to be a one on one for ECF staff all morning. Staff states at one point patient wrapped oxygen cable around his neck and said he wanted to die. Pt denies any suicidal thoughts or plans here. Pt states he did that just to get staff away. Pt very coroperative with ED staff and allows for vitals and IV and blood work. Pt alert and oriented to person and time however not to place. Pt placed on telemetry monitoring with ST segment capabilities, states no further needs, EKG completed and PIV placed with no issues, will continue to monitor.       [address], RN  09/28/20 6994

## 2020-09-28 NOTE — ED NOTES
Pt son updated at this time, Manuela Fox, 31 75 62.      [facility], RN  09/28/20 0568

## 2020-09-28 NOTE — ED NOTES
Pt report called back to [facility] AT [facility] at [facility], states no questions or concerns at this time.      [address], RN  09/28/20 5147

## 2020-09-28 NOTE — ED PROVIDER NOTES
Diagnosis Date    [facility] ([facility]     Anemia     Arthritis     Atrial fibrillation ([facility]     CAD (coronary artery disease)     Cancer (HCC)     prostate    CHF (congestive heart failure) (HCC)     Chronic insomnia     Chronic kidney disease     COPD (chronic obstructive pulmonary disease) ([facility])     Depression     GERD (gastroesophageal reflux disease)     Hyperlipidemia     Hypertension     Macular degeneration     Stroke ([facility] 2011    during CABG surgery         SURGICAL HISTORY     Past Surgical History:   Procedure Laterality Date    BACK SURGERY      CARDIAC SURGERY      bypass 2011    CORONARY ARTERY BYPASS GRAFT  7/6/2011    Quadruple    CYSTOSCOPY  7/1/13    PROSTATE SURGERY      seed placement         CURRENTMEDICATIONS       Previous Medications    ACETAMINOPHEN (TYLENOL) 325 MG TABLET    Take 650 mg by mouth every 6 hours as needed. ALBUTEROL (PROVENTIL HFA;VENTOLIN HFA) 108 (90 BASE) MCG/ACT INHALER    Inhale 2 puffs into the lungs every 6 hours as needed. PRO AIR     ARFORMOTEROL TARTRATE (BROVANA) 15 MCG/2ML NEBU    Take 1 ampule by nebulization 2 times daily    ASPIRIN 81 MG EC TABLET    Take 81 mg by mouth daily. ATORVASTATIN (LIPITOR) 10 MG TABLET    Take 10 mg by mouth daily    BETAMETHASONE DIPROPIONATE (DIPROLENE) 0.05 % CREAM    Apply topically 2 times daily as needed Indications: for rash Apply topically 2 times daily. BIMATOPROST (LUMIGAN) 0.01 % SOLN OPHTHALMIC DROPS    Place 1 drop into both eyes nightly. BIMATOPROST 0.01 % SOLN    Apply 1 drop to eye nightly. BRIMONIDINE (ALPHAGAN) 0.2 % OPHTHALMIC SOLUTION    1 drop 3 times daily    BUDESONIDE-FORMOTEROL (SYMBICORT) 80-4.5 MCG/ACT AERO    Inhale 2 puffs into the lungs 2 times daily    CALCIUM CARBONATE-VITAMIN D (CALCIUM 600 + D) 600-400 MG-UNIT TABS PER TAB    Take 1 tablet by mouth 2 times daily     DOCUSATE SODIUM (COLACE) 100 MG CAPSULE    Take 100 mg by mouth 2 times daily.     DONEPEZIL (ARICEPT) 5 MG TABLET    Take 5 mg by mouth nightly    ESCITALOPRAM (LEXAPRO) 10 MG TABLET    Take 20 mg by mouth daily     FERROUS GLUCONATE 325 (36 FE) MG TABS    Take by mouth 2 times daily Indications: for anemia     FEXOFENADINE HCL (ALLEGRA PO)    Take by mouth    FISH OIL-OMEGA-3 FATTY ACIDS 1000 MG CAPSULE    Take 2 g by mouth daily. FUROSEMIDE (LASIX) 20 MG TABLET    Take 1 tablet by mouth daily. GABAPENTIN (NEURONTIN) 100 MG CAPSULE    Take 100 mg by mouth nightly     HYDRALAZINE (APRESOLINE) 50 MG TABLET    Take 50 mg by mouth 2 times daily     IPRATROPIUM-ALBUTEROL (COMBIVENT IN)    Inhale  into the lungs. IPRATROPIUM-ALBUTEROL (DUONEB) 0.5-2.5 (3) MG/3ML SOLN NEBULIZER SOLUTION    Inhale 1 vial into the lungs 2 times daily. ISOSORBIDE MONONITRATE (IMDUR) 60 MG CR TABLET    Take 60 mg by mouth daily. LOPERAMIDE (IMODIUM) 2 MG CAPSULE    Take 2 mg by mouth every 6 hours as needed. MAGNESIUM HYDROXIDE (MILK OF MAGNESIA) 400 MG/5ML SUSPENSION    Take  by mouth daily as needed for Constipation. MEMANTINE (NAMENDA) 10 MG TABLET    Take 10 mg by mouth 2 times daily    METOPROLOL (LOPRESSOR) 50 MG TABLET    Take 25 mg by mouth 2 times daily     MIRTAZAPINE (REMERON) 15 MG TABLET    Take 15 mg by mouth nightly. MONTELUKAST (SINGULAIR) 10 MG TABLET    Take 10 mg by mouth nightly    MULTIPLE VITAMINS-MINERALS (OCUVITE EYE + MULTI PO)    Take  by mouth. NITROGLYCERIN (NITROSTAT) 0.4 MG SL TABLET    Place 1 tablet under the tongue every 5 minutes as needed for Chest pain. NYSTATIN-TRIAMCINOLONE (MYCOLOG II) 556598-9.1 UNIT/GM-% CREAM    Apply topically 2 times daily Apply topically 4 times daily. OMEPRAZOLE (PRILOSEC) 20 MG CAPSULE    Take 20 mg by mouth 2 times daily.     ONDANSETRON (ZOFRAN) 4 MG TABLET    Take 4 mg by mouth every 8 hours as needed for Nausea or Vomiting    POLYETHYLENE GLYCOL (MIRALAX) PACKET    Take 17 g by mouth daily     POTASSIUM CHLORIDE (K-DUR) 10 MEQ TABLET    Take 1 tablet by mouth 2 times daily. PROMETHAZINE (PHENERGAN) 25 MG TABLET    Take 25 mg by mouth every 6 hours as needed. SIMVASTATIN (ZOCOR) 20 MG TABLET    Take 20 mg by mouth nightly. SOAP & CLEANSERS (MOISTUREL SKIN CLEANSER EX)    Apply topically as needed Indications: may keep bedside     SODIUM CHLORIDE (OCEAN, BABY AYR) 0.65 % NASAL SPRAY    2 sprays by Nasal route as needed for Congestion 2 sprays in both nostrils or dry nasal passages    TAMSULOSIN (FLOMAX) 0.4 MG CAPSULE    Take 0.4 mg by mouth daily. TIOTROPIUM (SPIRIVA) 18 MCG INHALATION CAPSULE    Inhale 2.5 mcg into the lungs daily    TOLTERODINE (DETROL) 2 MG TABLET    Take 2 mg by mouth 2 times daily    UMECLIDINIUM BROMIDE (INCRUSE ELLIPTA) 62.5 MCG/INH AEPB    Inhale 1 puff into the lungs daily         ALLERGIES     Codeine; Levofloxacin; and Morphine and related    FAMILYHISTORY       Family History   Problem Relation Age of Onset    High Blood Pressure Mother     High Blood Pressure Father     Cancer Brother     Heart Disease Neg Hx     High Cholesterol Neg Hx           SOCIAL HISTORY       Social History     Tobacco Use    Smoking status: Former Smoker     Last attempt to quit: 4/3/1952     Years since quittin.5    Smokeless tobacco: Never Used   Substance Use Topics    Alcohol use: Yes     Comment: social    Drug use: No       SCREENINGS             PHYSICAL EXAM    (up to 7 for level 4, 8 or more for level 5)     ED Triage Vitals   BP Temp Temp src Pulse Resp SpO2 Height Weight   -- -- -- -- -- -- -- --       Physical Exam  Vitals signs and nursing note reviewed. Constitutional:       Appearance: He is well-developed. He is not diaphoretic. Comments: Chronically ill-appearing   HENT:      Head: Atraumatic. Nose: Nose normal.      Mouth/Throat:      Mouth: Mucous membranes are moist.      Pharynx: No oropharyngeal exudate or posterior oropharyngeal erythema.    Eyes:      General: Right eye: No discharge. Left eye: No discharge. Extraocular Movements: Extraocular movements intact. Pupils: Pupils are equal, round, and reactive to light. Neck:      Musculoskeletal: Normal range of motion. Cardiovascular:      Rate and Rhythm: Normal rate and regular rhythm. Heart sounds: No murmur. No friction rub. No gallop. Pulmonary:      Effort: Pulmonary effort is normal. No respiratory distress. Breath sounds: No stridor. No wheezing, rhonchi or rales. Abdominal:      General: Bowel sounds are normal. There is no distension. Palpations: Abdomen is soft. There is no mass. Tenderness: There is no abdominal tenderness. There is no guarding or rebound. Hernia: No hernia is present. Musculoskeletal: Normal range of motion. General: No swelling. Comments: Moving all 4 extremities easily. No midline tenderness or step-off in the neck or back or obvious sign of trauma. Skin:     General: Skin is warm and dry. Findings: No erythema or rash. Neurological:      Mental Status: He is alert. Cranial Nerves: No cranial nerve deficit.    Psychiatric:         Behavior: Behavior normal.         DIAGNOSTIC RESULTS   LABS:    Labs Reviewed   CBC WITH AUTO DIFFERENTIAL - Abnormal; Notable for the following components:       Result Value    RBC 3.17 (*)     Hemoglobin 10.5 (*)     Hematocrit 30.7 (*)     All other components within normal limits    Narrative:     Performed at:  [facility] [address], [place], [address]   Phone [phone]   COMPREHENSIVE METABOLIC PANEL - Abnormal; Notable for the following components:    CREATININE 1.9 (*)     GFR Non- 33 (*)     GFR  40 (*)     Total Protein 5.6 (*)     Alb 2.9 (*)     All other components within normal limits    Narrative:     Performed at:  [facility] [address], [place], [place] [address]   Phone [phone]   TROPONIN - Abnormal; Notable for the following components:    Troponin 0.07 (*)     All other components within normal limits    Narrative:     Performed at:  [facility] [address]  [place] [address] [employer]   Phone [phone]   URINE RT REFLEX TO CULTURE - Abnormal; Notable for the following components:    Clarity, UA CLOUDY (*)     Protein, UA TRACE (*)     Leukocyte Esterase, Urine MODERATE (*)     All other components within normal limits    Narrative:     Performed at:  [facility] [address] [facility] [address]  [place] [address] [employer]   Phone [phone]   CK - Abnormal; Notable for the following components: Total CK 33 (*)     All other components within normal limits    Narrative:     Performed at:  [facility] [address] [facility] [address]  [place] [address] [employer]   Phone [phone]   MICROSCOPIC URINALYSIS - Abnormal; Notable for the following components:    Yeast, UA Present (*)     WBC, UA 18 (*)     All other components within normal limits    Narrative:     Performed at:  [facility] [address] [facility] [place],  [place] [address] [employer]   Phone [phone]   TROPONIN - Abnormal; Notable for the following components:    Troponin 0.07 (*)     All other components within normal limits    Narrative:     Performed at:  [facility] [address] [facility] [place],  [place] [address] [employer]   Phone [phone]   CULTURE, URINE   MAGNESIUM    Narrative:     Performed at:  [facility] [address] [facility] [place],  [place] [address] [employer]   Phone [phone]       All other labs were within normal range or not returned as of this dictation. EKG: All EKG's are interpreted by the Emergency Department Physician in the absence of a cardiologist.  Please see their note for interpretation of EKG.       RADIOLOGY:   Non-plain film images such as CT, Ultrasound and MRI are read by the radiologist. Plain radiographic images are visualized and preliminarily interpreted by the ED Provider with the below findings:        Interpretation per the Radiologist below, if available at the time of this note:    XR CHEST PORTABLE   Final Result   No acute cardiopulmonary process         CT CERVICAL SPINE WO CONTRAST   Final Result   No acute abnormality of the cervical spine. Multilevel degenerative disc disease and multilevel facet arthropathy. CT HEAD WO CONTRAST   Final Result   No acute intracranial abnormality. No results found. PROCEDURES   Unless otherwise noted below, none     Procedures    CRITICAL CARE TIME   N/A    CONSULTS:  None      EMERGENCY DEPARTMENT COURSE and DIFFERENTIAL DIAGNOSIS/MDM:   Vitals:    Vitals:    09/28/20 1531 09/28/20 1600 09/28/20 1630 09/28/20 1700   BP:  (!) 145/59 (!) 130/39 (!) 139/47   Pulse: 91 88 84 83   Resp: 16 19 22 22   Temp:       TempSrc:       SpO2:  98% 98% 94%       Patient was given the following medications:  Medications   acetaminophen (TYLENOL) tablet 1,000 mg (1,000 mg Oral Given 9/28/20 1355)   LORazepam (ATIVAN) injection 0.5 mg (0.5 mg Intravenous Given 9/28/20 1620)           Patient presented with apparently becoming more agitated with staff at care facility. Apparently had a episode today where he wrapped a cord around his neck but he states that he has no thoughts of hurting himself and just did this to get the staff away. Patient continues to deny any pain here. He has history of chronic kidney disease and troponin is slightly elevated but denies any chest pain or shortness of breath repeat troponin is still at 0.07. CT imaging is unremarkable. Low suspicion for skull fracture, subdural hematoma, epidural hematoma, acute coronary syndrome or other emergent etiology. Patient is already on Rocephin for urinary tract infection.   Do not believe any further work-up or testing is warranted this time. FINAL IMPRESSION      1. Fall, initial encounter    2.  Chronic kidney disease, unspecified CKD stage          DISPOSITION/PLAN   DISPOSITION Decision To Discharge 09/28/2020 04:01:05 PM      PATIENT REFERREDTO:  Da Canales MD    Schedule an appointment as soon as possible for a visit in 3 days  For re-check    [facility] Emergency Department  [address] [place] [address]  [phone]    As needed      DISCHARGE MEDICATIONS:  New Prescriptions    No medications on file       DISCONTINUED MEDICATIONS:  Discontinued Medications    No medications on file              (Please note that portions of this note were completed with a voice recognition program.  Efforts were made to edit the dictations but occasionally words are mis-transcribed.)    Soila Bal PA-C (electronically signed)           Soila Bal PA-C  09/28/20 7071

## 2020-09-29 LAB
EKG ATRIAL RATE: 86 BPM
EKG DIAGNOSIS: NORMAL
EKG P AXIS: 101 DEGREES
EKG P-R INTERVAL: 340 MS
EKG Q-T INTERVAL: 398 MS
EKG QRS DURATION: 124 MS
EKG QTC CALCULATION (BAZETT): 476 MS
EKG R AXIS: 53 DEGREES
EKG T AXIS: 262 DEGREES
EKG VENTRICULAR RATE: 86 BPM
URINE CULTURE, ROUTINE: NORMAL

## 2020-09-29 PROCEDURE — 93010 ELECTROCARDIOGRAM REPORT: CPT | Performed by: INTERNAL MEDICINE

## 2020-09-29 NOTE — ED NOTES
PT report given to Justino meng, states no questions or concerns at this time.       [address], RN  09/28/20 7931